# Patient Record
Sex: MALE | Race: BLACK OR AFRICAN AMERICAN | NOT HISPANIC OR LATINO | Employment: FULL TIME | URBAN - METROPOLITAN AREA
[De-identification: names, ages, dates, MRNs, and addresses within clinical notes are randomized per-mention and may not be internally consistent; named-entity substitution may affect disease eponyms.]

---

## 2017-05-25 DIAGNOSIS — M76.62 LEFT ACHILLES BURSITIS: Primary | ICD-10-CM

## 2017-05-25 DIAGNOSIS — M25.561 RIGHT KNEE PAIN, UNSPECIFIED CHRONICITY: ICD-10-CM

## 2017-05-26 ENCOUNTER — HOSPITAL ENCOUNTER (OUTPATIENT)
Dept: RADIOLOGY | Facility: HOSPITAL | Age: 27
Discharge: HOME | End: 2017-05-26
Attending: ORTHOPAEDIC SURGERY
Payer: COMMERCIAL

## 2017-05-26 DIAGNOSIS — M76.62 LEFT ACHILLES BURSITIS: ICD-10-CM

## 2017-05-26 PROCEDURE — 73721 MRI JNT OF LWR EXTRE W/O DYE: CPT | Mod: 26,LT,, | Performed by: RADIOLOGY

## 2017-05-26 PROCEDURE — 73721 MRI JNT OF LWR EXTRE W/O DYE: CPT | Mod: TC,LT

## 2017-05-26 RX ORDER — INDOMETHACIN 75 MG/1
75 CAPSULE, EXTENDED RELEASE ORAL 2 TIMES DAILY
Qty: 60 CAPSULE | Refills: 1 | Status: SHIPPED | OUTPATIENT
Start: 2017-05-26 | End: 2017-07-27 | Stop reason: SDUPTHER

## 2017-05-26 RX ORDER — ESOMEPRAZOLE MAGNESIUM 40 MG/1
40 CAPSULE, DELAYED RELEASE ORAL
Qty: 30 CAPSULE | Refills: 1 | Status: SHIPPED | OUTPATIENT
Start: 2017-05-26 | End: 2017-07-27 | Stop reason: SDUPTHER

## 2017-06-07 ENCOUNTER — HOSPITAL ENCOUNTER (OUTPATIENT)
Dept: RADIOLOGY | Facility: HOSPITAL | Age: 27
Discharge: HOME | End: 2017-06-07
Attending: ORTHOPAEDIC SURGERY
Payer: COMMERCIAL

## 2017-06-07 ENCOUNTER — TELEPHONE (OUTPATIENT)
Dept: SPORTS MEDICINE | Facility: CLINIC | Age: 27
End: 2017-06-07

## 2017-06-07 DIAGNOSIS — M25.572 LEFT ANKLE PAIN, UNSPECIFIED CHRONICITY: Primary | ICD-10-CM

## 2017-06-07 DIAGNOSIS — M25.572 LEFT ANKLE PAIN, UNSPECIFIED CHRONICITY: ICD-10-CM

## 2017-06-07 PROCEDURE — 73721 MRI JNT OF LWR EXTRE W/O DYE: CPT | Mod: 26,LT,, | Performed by: RADIOLOGY

## 2017-06-07 PROCEDURE — 73721 MRI JNT OF LWR EXTRE W/O DYE: CPT | Mod: TC,LT

## 2017-07-27 DIAGNOSIS — M76.62 LEFT ACHILLES BURSITIS: ICD-10-CM

## 2017-07-28 RX ORDER — INDOMETHACIN 75 MG/1
75 CAPSULE, EXTENDED RELEASE ORAL 2 TIMES DAILY
Qty: 60 CAPSULE | Refills: 1 | Status: SHIPPED | OUTPATIENT
Start: 2017-07-28

## 2017-07-28 RX ORDER — ESOMEPRAZOLE MAGNESIUM 40 MG/1
40 CAPSULE, DELAYED RELEASE ORAL
Qty: 30 CAPSULE | Refills: 1 | Status: SHIPPED | OUTPATIENT
Start: 2017-07-28

## 2017-08-02 DIAGNOSIS — S39.012A LUMBAR STRAIN, INITIAL ENCOUNTER: Primary | ICD-10-CM

## 2017-08-03 RX ORDER — CHLORZOXAZONE 500 MG/1
500 TABLET ORAL 3 TIMES DAILY
Qty: 90 TABLET | Refills: 1 | Status: SHIPPED | OUTPATIENT
Start: 2017-08-03 | End: 2017-10-02

## 2017-08-04 ENCOUNTER — TELEPHONE (OUTPATIENT)
Dept: SPORTS MEDICINE | Facility: CLINIC | Age: 27
End: 2017-08-04

## 2017-08-04 DIAGNOSIS — M62.89 MUSCLE TIGHTNESS: Primary | ICD-10-CM

## 2018-01-08 ENCOUNTER — OFFICE VISIT (OUTPATIENT)
Dept: OPHTHALMOLOGY | Facility: CLINIC | Age: 28
End: 2018-01-08
Payer: COMMERCIAL

## 2018-01-08 DIAGNOSIS — S05.8X2A BLUNT TRAUMA OF LEFT EYE, INITIAL ENCOUNTER: Primary | ICD-10-CM

## 2018-01-08 PROCEDURE — 99999 PR PBB SHADOW E&M-EST. PATIENT-LVL II: CPT | Mod: PBBFAC,,, | Performed by: OPHTHALMOLOGY

## 2018-01-08 PROCEDURE — 92004 COMPRE OPH EXAM NEW PT 1/>: CPT | Mod: S$GLB,,, | Performed by: OPHTHALMOLOGY

## 2018-01-08 PROCEDURE — 92020 GONIOSCOPY: CPT | Mod: S$GLB,,, | Performed by: OPHTHALMOLOGY

## 2018-01-08 NOTE — PROGRESS NOTES
HPI     27 year old male   Pt was struck in the left eye by another players finger during [employer] game   yesterday.   moderate pain.   No diplopia   Slight photophobia       Last edited by Maddie Perales on 1/8/2018 11:22 AM. (History)            Assessment /Plan     For exam results, see Encounter Report.    Blunt trauma of left eye, initial encounter      Blunt Trauma Finger to Left upper lid / eye 1/07/2018  Play [place] game [employer] / [place]     Denies photophobia, F / F and feels is back to baseline    Unremarkable eye exam    RD precautions:  Discussed symptoms of RD with increased flashes, floaters, decreasing vision.  Patient/Family to call and return immediately to clinic should the symptoms of RD occur. Voiced good understanding with Q & A.    Uses indomethacin throughout the season      Plan  RTC with pre-season screening  RTC sooner prn with good understanding

## 2018-06-11 ENCOUNTER — CLINICAL SUPPORT (OUTPATIENT)
Dept: ADMINISTRATIVE | Facility: CLINIC | Age: 28
End: 2018-06-11
Payer: COMMERCIAL

## 2018-06-11 DIAGNOSIS — Z00.00 ROUTINE GENERAL MEDICAL EXAMINATION AT A HEALTH CARE FACILITY: Primary | ICD-10-CM

## 2018-06-11 LAB
25(OH)D3+25(OH)D2 SERPL-MCNC: 39 NG/ML
ALBUMIN SERPL BCP-MCNC: 4.9 G/DL
ALP SERPL-CCNC: 63 U/L
ALT SERPL W/O P-5'-P-CCNC: 44 U/L
ANION GAP SERPL CALC-SCNC: 10 MMOL/L
AST SERPL-CCNC: 30 U/L
BILIRUB SERPL-MCNC: 1 MG/DL
BILIRUB UR QL STRIP: NEGATIVE
BUN SERPL-MCNC: 21 MG/DL
CALCIUM SERPL-MCNC: 10.3 MG/DL
CHLORIDE SERPL-SCNC: 104 MMOL/L
CHOLEST SERPL-MCNC: 177 MG/DL
CHOLEST/HDLC SERPL: 3.5 {RATIO}
CLARITY UR REFRACT.AUTO: CLEAR
CO2 SERPL-SCNC: 27 MMOL/L
COLOR UR AUTO: YELLOW
CREAT SERPL-MCNC: 1.3 MG/DL
ERYTHROCYTE [DISTWIDTH] IN BLOOD BY AUTOMATED COUNT: 11.8 %
EST. GFR  (AFRICAN AMERICAN): >60 ML/MIN/1.73 M^2
EST. GFR  (NON AFRICAN AMERICAN): >60 ML/MIN/1.73 M^2
ESTIMATED AVG GLUCOSE: 74 MG/DL
GLUCOSE SERPL-MCNC: 83 MG/DL
GLUCOSE UR QL STRIP: NEGATIVE
HBA1C MFR BLD HPLC: 4.2 %
HCT VFR BLD AUTO: 49 %
HDLC SERPL-MCNC: 50 MG/DL
HDLC SERPL: 28.2 %
HGB BLD-MCNC: 17.2 G/DL
HGB UR QL STRIP: NEGATIVE
KETONES UR QL STRIP: NEGATIVE
LDLC SERPL CALC-MCNC: 109.8 MG/DL
LEUKOCYTE ESTERASE UR QL STRIP: NEGATIVE
MCH RBC QN AUTO: 34.1 PG
MCHC RBC AUTO-ENTMCNC: 35.1 G/DL
MCV RBC AUTO: 97 FL
MICROSCOPIC COMMENT: NORMAL
NITRITE UR QL STRIP: NEGATIVE
NONHDLC SERPL-MCNC: 127 MG/DL
PH UR STRIP: 5 [PH] (ref 5–8)
PLATELET # BLD AUTO: 194 K/UL
PMV BLD AUTO: 11.9 FL
POTASSIUM SERPL-SCNC: 4.1 MMOL/L
PROT SERPL-MCNC: 8.1 G/DL
PROT UR QL STRIP: NEGATIVE
RBC # BLD AUTO: 5.04 M/UL
RBC #/AREA URNS AUTO: 1 /HPF (ref 0–4)
SODIUM SERPL-SCNC: 141 MMOL/L
SP GR UR STRIP: 1.03 (ref 1–1.03)
TRIGL SERPL-MCNC: 86 MG/DL
TSH SERPL DL<=0.005 MIU/L-ACNC: 1.15 UIU/ML
URN SPEC COLLECT METH UR: NORMAL
UROBILINOGEN UR STRIP-ACNC: NEGATIVE EU/DL
WBC # BLD AUTO: 6.8 K/UL
WBC #/AREA URNS AUTO: 1 /HPF (ref 0–5)

## 2018-06-11 PROCEDURE — 85027 COMPLETE CBC AUTOMATED: CPT

## 2018-06-11 PROCEDURE — 80061 LIPID PANEL: CPT

## 2018-06-11 PROCEDURE — 83036 HEMOGLOBIN GLYCOSYLATED A1C: CPT

## 2018-06-11 PROCEDURE — 80053 COMPREHEN METABOLIC PANEL: CPT

## 2018-06-11 PROCEDURE — 82306 VITAMIN D 25 HYDROXY: CPT

## 2018-06-11 PROCEDURE — 84443 ASSAY THYROID STIM HORMONE: CPT

## 2018-06-11 PROCEDURE — 81001 URINALYSIS AUTO W/SCOPE: CPT

## 2018-06-13 DIAGNOSIS — Z00.00 ROUTINE GENERAL MEDICAL EXAMINATION AT A HEALTH CARE FACILITY: Primary | ICD-10-CM

## 2018-09-24 ENCOUNTER — TELEPHONE (OUTPATIENT)
Dept: SPORTS MEDICINE | Facility: CLINIC | Age: 28
End: 2018-09-24

## 2018-09-24 ENCOUNTER — HOSPITAL ENCOUNTER (OUTPATIENT)
Dept: RADIOLOGY | Facility: HOSPITAL | Age: 28
Discharge: HOME | End: 2018-09-24
Attending: PHYSICIAN ASSISTANT
Payer: COMMERCIAL

## 2018-09-24 DIAGNOSIS — M79.601 RIGHT ARM PAIN: Primary | ICD-10-CM

## 2018-09-24 DIAGNOSIS — M79.601 RIGHT ARM PAIN: ICD-10-CM

## 2018-09-24 DIAGNOSIS — M79.602 LEFT UPPER LIMB PAIN: ICD-10-CM

## 2018-09-24 PROCEDURE — 73218 MRI UPPER EXTREMITY W/O DYE: CPT | Mod: TC,RT

## 2018-09-24 PROCEDURE — 73218 MRI UPPER EXTREMITY W/O DYE: CPT | Mod: 26,RT,, | Performed by: RADIOLOGY

## 2018-12-31 ENCOUNTER — TELEPHONE (OUTPATIENT)
Dept: SPORTS MEDICINE | Facility: CLINIC | Age: 28
End: 2018-12-31

## 2018-12-31 ENCOUNTER — HOSPITAL ENCOUNTER (OUTPATIENT)
Dept: RADIOLOGY | Facility: HOSPITAL | Age: 28
Discharge: HOME | End: 2018-12-31
Attending: ORTHOPAEDIC SURGERY
Payer: COMMERCIAL

## 2018-12-31 ENCOUNTER — HOSPITAL ENCOUNTER (OUTPATIENT)
Dept: INTERVENTIONAL RADIOLOGY/VASCULAR | Facility: HOSPITAL | Age: 28
Discharge: HOME | End: 2018-12-31
Attending: ORTHOPAEDIC SURGERY
Payer: COMMERCIAL

## 2018-12-31 VITALS
DIASTOLIC BLOOD PRESSURE: 75 MMHG | HEART RATE: 55 BPM | OXYGEN SATURATION: 96 % | RESPIRATION RATE: 16 BRPM | SYSTOLIC BLOOD PRESSURE: 144 MMHG

## 2018-12-31 DIAGNOSIS — M54.9 BACK PAIN, UNSPECIFIED BACK LOCATION, UNSPECIFIED BACK PAIN LATERALITY, UNSPECIFIED CHRONICITY: ICD-10-CM

## 2018-12-31 DIAGNOSIS — M54.9 BACK PAIN, UNSPECIFIED BACK LOCATION, UNSPECIFIED BACK PAIN LATERALITY, UNSPECIFIED CHRONICITY: Primary | ICD-10-CM

## 2018-12-31 PROCEDURE — 72158 MRI LUMBAR SPINE W/O & W/DYE: CPT | Mod: 26,,, | Performed by: RADIOLOGY

## 2018-12-31 PROCEDURE — A9585 GADOBUTROL INJECTION: HCPCS | Performed by: ORTHOPAEDIC SURGERY

## 2018-12-31 PROCEDURE — 64483 NJX AA&/STRD TFRM EPI L/S 1: CPT | Mod: 50,,, | Performed by: RADIOLOGY

## 2018-12-31 PROCEDURE — 25500020 PHARM REV CODE 255: Performed by: ORTHOPAEDIC SURGERY

## 2018-12-31 PROCEDURE — 72158 MRI LUMBAR SPINE W WO CONTRAST: ICD-10-PCS | Mod: 26,,, | Performed by: RADIOLOGY

## 2018-12-31 PROCEDURE — 64483 IR ESI LUMBAR W/O IMG: ICD-10-PCS | Mod: 50,,, | Performed by: RADIOLOGY

## 2018-12-31 PROCEDURE — 63600175 PHARM REV CODE 636 W HCPCS: Performed by: RADIOLOGY

## 2018-12-31 PROCEDURE — 72158 MRI LUMBAR SPINE W/O & W/DYE: CPT | Mod: TC

## 2018-12-31 PROCEDURE — A4215 STERILE NEEDLE: HCPCS

## 2018-12-31 RX ORDER — METHYLPREDNISOLONE ACETATE 40 MG/ML
120 INJECTION, SUSPENSION INTRA-ARTICULAR; INTRALESIONAL; INTRAMUSCULAR; SOFT TISSUE ONCE
Status: COMPLETED | OUTPATIENT
Start: 2018-12-31 | End: 2018-12-31

## 2018-12-31 RX ORDER — GADOBUTROL 604.72 MG/ML
10 INJECTION INTRAVENOUS
Status: COMPLETED | OUTPATIENT
Start: 2018-12-31 | End: 2018-12-31

## 2018-12-31 RX ADMIN — METHYLPREDNISOLONE ACETATE 120 MG: 40 INJECTION, SUSPENSION INTRA-ARTICULAR; INTRALESIONAL; INTRAMUSCULAR; SOFT TISSUE at 02:12

## 2018-12-31 RX ADMIN — GADOBUTROL 10 ML: 604.72 INJECTION INTRAVENOUS at 07:12

## 2018-12-31 NOTE — PROGRESS NOTES
Pt refusing d/c paperwork. Pt given verbal information on d/c instructions and caring for site. Dressing to lower back CDI. Pt refusing wheelchair at this time. Pt ambulated to lobby at this time with no distress.

## 2018-12-31 NOTE — PROCEDURES
Radiology Post-Procedure Note    Pre Op Diagnosis: LBP    Post Op Diagnosis: Same    Procedure: Lumbar interlaminal and transforaminal LEYDA    Procedure performed by: Meir Dumont MD    Written Informed Consent Obtained: Yes    Specimen Removed: NO    Estimated Blood Loss: Minimal    Findings:     Level injected: L5-S1  Needle used: 22 gauge needle for TF ESIs at L5-S1 bilaterally and 18 gauge for interlaminar LEYDA  Dose:  40 mg Depo-methylprednisolone x 3 at each site   1.5 mL  Bupivicaine 0.25% MPF x 3 at each site    Patient tolerated procedure well.    Meir Dumont MD  Department of Radiology  Pager: [phone]

## 2018-12-31 NOTE — H&P
Radiology Minor Procedure Note    Procedure Requested: LEYDA    History of Present Illness:  Ahsan Salmon is a 28 y.o. male with history of lumbar pain.  No past medical history on file.  Past Surgical History:   Procedure Laterality Date    LAMINECTOMY         Allergies: Review of patient's allergies indicates:  No Known Allergies    Current Inpatient Meds:   Home Meds:   Prior to Admission medications    Medication Sig Start Date End Date Taking? Authorizing Provider   esomeprazole (NEXIUM) 40 MG capsule Take 1 capsule (40 mg total) by mouth before breakfast. 7/28/17   Tadeo Cagle MD   indomethacin (INDOCIN SR) 75 mg CpSR CR capsule Take 1 capsule (75 mg total) by mouth 2 (two) times daily. 7/28/17   Tadeo Cagle MD   zolpidem (AMBIEN) 10 mg Tab Take 1 tablet (10 mg total) by mouth nightly as needed. 4/29/14 10/28/14  Tadeo Cagle MD      Anticoagulants/Antiplatelets: no anticoagulation    Labs:  No results found for: INR    Lab Results   Component Value Date    WBC 6.80 06/11/2018    HGB 17.2 06/11/2018    HCT 49.0 06/11/2018    MCV 97 06/11/2018     06/11/2018      Lab Results   Component Value Date    GLU 83 06/11/2018     06/11/2018    K 4.1 06/11/2018     06/11/2018    CO2 27 06/11/2018    BUN 21 (H) 06/11/2018    CREATININE 1.3 06/11/2018    CALCIUM 10.3 06/11/2018    ALT 44 06/11/2018    AST 30 06/11/2018    ALBUMIN 4.9 06/11/2018       Objective:  Vitals: Pulse: (!) 55 (12/31/18 1312)  Resp: 16 (12/31/18 1312)  BP: (!) 144/75 (12/31/18 1312)  SpO2: 96 % (12/31/18 1312)   PE:  No Upper Extremity clubbing, cyanosis or edema    Plan: LEYDA lumbar. Informed consent obtained. Local [facility]    Meir Dumont MD  Department of Radiology  Pager: [phone]

## 2019-06-05 DIAGNOSIS — Z00.00 ROUTINE GENERAL MEDICAL EXAMINATION AT A HEALTH CARE FACILITY: Primary | ICD-10-CM

## 2019-06-06 ENCOUNTER — HOSPITAL ENCOUNTER (OUTPATIENT)
Dept: CARDIOLOGY | Facility: CLINIC | Age: 29
Discharge: HOME | End: 2019-06-06

## 2019-06-06 ENCOUNTER — CLINICAL SUPPORT (OUTPATIENT)
Dept: INTERNAL MEDICINE | Facility: CLINIC | Age: 29
End: 2019-06-06

## 2019-06-06 DIAGNOSIS — Z00.00 ROUTINE GENERAL MEDICAL EXAMINATION AT A HEALTH CARE FACILITY: ICD-10-CM

## 2019-06-06 DIAGNOSIS — Z00.00 ANNUAL PHYSICAL EXAM: Primary | ICD-10-CM

## 2019-06-06 LAB
25(OH)D3+25(OH)D2 SERPL-MCNC: 27 NG/ML (ref 30–96)
ALBUMIN SERPL BCP-MCNC: 4.4 G/DL (ref 3.5–5.2)
ALP SERPL-CCNC: 60 U/L (ref 55–135)
ALT SERPL W/O P-5'-P-CCNC: 55 U/L (ref 10–44)
ANION GAP SERPL CALC-SCNC: 6 MMOL/L (ref 8–16)
AST SERPL-CCNC: 34 U/L (ref 10–40)
BILIRUB SERPL-MCNC: 1 MG/DL (ref 0.1–1)
BILIRUB UR QL STRIP: NEGATIVE
BUN SERPL-MCNC: 21 MG/DL (ref 6–20)
CALCIUM SERPL-MCNC: 9.9 MG/DL (ref 8.7–10.5)
CHLORIDE SERPL-SCNC: 104 MMOL/L (ref 95–110)
CHOLEST SERPL-MCNC: 143 MG/DL (ref 120–199)
CHOLEST/HDLC SERPL: 3.3 {RATIO} (ref 2–5)
CLARITY UR REFRACT.AUTO: CLEAR
CO2 SERPL-SCNC: 30 MMOL/L (ref 23–29)
COLOR UR AUTO: YELLOW
CREAT SERPL-MCNC: 1.2 MG/DL (ref 0.5–1.4)
ERYTHROCYTE [DISTWIDTH] IN BLOOD BY AUTOMATED COUNT: 11.9 % (ref 11.5–14.5)
EST. GFR  (AFRICAN AMERICAN): >60 ML/MIN/1.73 M^2
EST. GFR  (NON AFRICAN AMERICAN): >60 ML/MIN/1.73 M^2
ESTIMATED AVG GLUCOSE: 74 MG/DL (ref 68–131)
GLUCOSE SERPL-MCNC: 85 MG/DL (ref 70–110)
GLUCOSE UR QL STRIP: NEGATIVE
HBA1C MFR BLD HPLC: 4.2 % (ref 4–5.6)
HCT VFR BLD AUTO: 47.1 % (ref 40–54)
HDLC SERPL-MCNC: 43 MG/DL (ref 40–75)
HDLC SERPL: 30.1 % (ref 20–50)
HGB BLD-MCNC: 16.3 G/DL (ref 14–18)
HGB UR QL STRIP: NEGATIVE
KETONES UR QL STRIP: NEGATIVE
LDLC SERPL CALC-MCNC: 86.4 MG/DL (ref 63–159)
LEUKOCYTE ESTERASE UR QL STRIP: NEGATIVE
MCH RBC QN AUTO: 33.8 PG (ref 27–31)
MCHC RBC AUTO-ENTMCNC: 34.6 G/DL (ref 32–36)
MCV RBC AUTO: 98 FL (ref 82–98)
NITRITE UR QL STRIP: NEGATIVE
NONHDLC SERPL-MCNC: 100 MG/DL
PH UR STRIP: 6 [PH] (ref 5–8)
PLATELET # BLD AUTO: 177 K/UL (ref 150–350)
PMV BLD AUTO: 11.2 FL (ref 9.2–12.9)
POTASSIUM SERPL-SCNC: 4.2 MMOL/L (ref 3.5–5.1)
PROT SERPL-MCNC: 7.5 G/DL (ref 6–8.4)
PROT UR QL STRIP: NEGATIVE
RBC # BLD AUTO: 4.82 M/UL (ref 4.6–6.2)
SODIUM SERPL-SCNC: 140 MMOL/L (ref 136–145)
SP GR UR STRIP: 1.02 (ref 1–1.03)
TRIGL SERPL-MCNC: 68 MG/DL (ref 30–150)
TSH SERPL DL<=0.005 MIU/L-ACNC: 0.76 UIU/ML (ref 0.4–4)
URN SPEC COLLECT METH UR: NORMAL
WBC # BLD AUTO: 5.08 K/UL (ref 3.9–12.7)

## 2019-06-06 PROCEDURE — 85027 COMPLETE CBC AUTOMATED: CPT

## 2019-06-06 PROCEDURE — 93000 EKG 12-LEAD: ICD-10-PCS | Mod: S$GLB,,, | Performed by: INTERNAL MEDICINE

## 2019-06-06 PROCEDURE — 80061 LIPID PANEL: CPT

## 2019-06-06 PROCEDURE — 80053 COMPREHEN METABOLIC PANEL: CPT

## 2019-06-06 PROCEDURE — 84443 ASSAY THYROID STIM HORMONE: CPT

## 2019-06-06 PROCEDURE — 36415 COLL VENOUS BLD VENIPUNCTURE: CPT

## 2019-06-06 PROCEDURE — 81003 URINALYSIS AUTO W/O SCOPE: CPT

## 2019-06-06 PROCEDURE — 82306 VITAMIN D 25 HYDROXY: CPT

## 2019-06-06 PROCEDURE — 83036 HEMOGLOBIN GLYCOSYLATED A1C: CPT

## 2019-06-06 PROCEDURE — 93000 ELECTROCARDIOGRAM COMPLETE: CPT | Mod: S$GLB,,, | Performed by: INTERNAL MEDICINE

## 2019-09-23 ENCOUNTER — OFFICE VISIT (OUTPATIENT)
Dept: SPORTS MEDICINE | Facility: CLINIC | Age: 29
End: 2019-09-23
Payer: COMMERCIAL

## 2019-09-23 DIAGNOSIS — M79.652 LEFT THIGH PAIN: Primary | ICD-10-CM

## 2019-09-23 DIAGNOSIS — M25.462 EFFUSION OF LEFT PREPATELLAR BURSA: ICD-10-CM

## 2019-09-23 DIAGNOSIS — S76.112A STRAIN OF LEFT QUADRICEPS, INITIAL ENCOUNTER: ICD-10-CM

## 2019-09-23 PROCEDURE — 99999 PR PBB SHADOW E&M-EST. PATIENT-LVL I: ICD-10-PCS | Mod: PBBFAC,,, | Performed by: FAMILY MEDICINE

## 2019-09-23 PROCEDURE — 99213 PR OFFICE/OUTPT VISIT, EST, LEVL III, 20-29 MIN: ICD-10-PCS | Mod: S$GLB,,, | Performed by: FAMILY MEDICINE

## 2019-09-23 PROCEDURE — 99213 OFFICE O/P EST LOW 20 MIN: CPT | Mod: S$GLB,,, | Performed by: FAMILY MEDICINE

## 2019-09-23 PROCEDURE — 99999 PR PBB SHADOW E&M-EST. PATIENT-LVL I: CPT | Mod: PBBFAC,,, | Performed by: FAMILY MEDICINE

## 2019-09-23 NOTE — PROGRESS NOTES
Ahsan Salmon, a 29 y.o. male, presents today for evaluation of his left knee.  Patient reports having multiple hits to his lateral knee over the duration of the game yesterday. Patient reports that he feels about 70% improvement since yesterday. Patient has mild tenderness Vastus lateralis, left.    History of Present Illness (HPI)  Location: lateral knee, left  Onset: acute, 9.22.2019  Palliative:    Relative rest   Oral analgesics  Provocative:    ADLS   Prolonged ambulation  Prior: none  Progression: resolved discomfort  Quality:    sharp pain  Radiation: none  Severity: per nursing documentation  Timing: intermittent w/ use  Trauma:     Review of Systems (ROS)  A 10+ review of systems was performed with pertinent positives and negatives noted above in the history of present illness. Other systems were negative unless otherwise specified.    Physical Examination (PE)  General:  The patient is alert and oriented x 3. Mood is pleasant. Observation of ears, eyes and nose reveal no gross abnormalities. HEENT: NCAT, sclera anicteric.   Lungs: Respirations are equal and unlabored.  Gait is coordinated. Patient can toe walk and heel walk without difficulty.    LEFT KNEE EXAMINATION    Observation/Inspection  Gait:   Nonantalgic   Alignment:  Neutral   Scars:   None   Muscle atrophy: Mild  Effusion:  None   Warmth:  None   Discoloration:   none     Tenderness / Crepitus (T / C):         T / C      T / C  Patella   - / -   Lateral joint line   - / -     Peripatellar medial  -  Medial joint line    + / -  Peripatellar lateral -  Medial plica   - / -  Patellar tendon -   Popliteal fossa   - / -  Quad tendon   -   Gastrocnemius   -  Prepatellar Bursa - / -   Quadricep   -  Tibial tubercle  -  Thigh/hamstring  + TTP  Pes anserine/HS -  Fibula    -  ITB   - / -  Tibia     -  Tib/fib joint  - / -  LCL    -    MFC   - / -   MCL: Proximal  -    LFC   - / -   Distal    -          ROM: (* = pain)  PASSIVE   ACTIVE    Left :   5  / 0 / 145   5 / 0 / 145     Right :    5 / 0 / 145   5 / 0 / 145    Patellofemoral examination:  See above noted areas of tenderness.   Patella position    Subluxation / dislocation: Centered        Sup. / Inf;   Normal   Crepitus (PF):    Absent   Patellar Mobility:       Medial-lateral:   Normal    Superior-inferior:  Normal    Inferior tilt   Normal    Patellar tendon:  Normal   Lateral tilt:    Normal   J-sign:     None   Patellofemoral grind:   No pain     Meniscal Signs:     Pain on terminal extension:  +  Pain on terminal flexion:  +  Shars maneuver:  +*  Squat     NT  Thessaly    NT    Ligament Examination:  ACL / Lachman:  WNL  PCL-Post.  drawer: normal 0 to 2mm  MCL- Valgus:  normal 0 to 2mm  LCL- Varus:    normal 0 to 2mm  Pivot shift:  guarding   Dial Test:   difference c/w other side   At 30° flexion: normal (< 5°)    At 90° flexion: normal (< 5°)   Reverse Pivot Shift:   normal (Equal)     Strength: (* = with pain) Painful Side  Quadriceps   5/5  Hamstrin/5    Extremity Neuro-vascular Examination:   Sensation:  Grossly intact to light touch all dermatomal regions.   Motor Function:  Fully intact motor function at hip, knee, foot and ankle    DTRs;  quadriceps and  achilles 2+.  No clonus and downgoing Babinski.    Vascular status:  DP and PT pulses 2+, brisk capillary refill, symmetric.     Other Findings:    ASSESSMENT & PLAN  Assessment:   #1 acute strain distal vastus lateralis muscle fibers and MTJ, resolving, left   W/ associated fluid collection (?hematoma), almost completely resolved since last evening  #2 subacute aseptic pre patellar bursitis, left   W/ associated modest bursal effusion    No evidence of neurologic pathology  No evidence of vascular pathology    Imaging studies reviewed:   msk us, knee, left 23 (today)    Plan:    Relative rest  fPT w/ medical / training team  Oral NSAIDs  F/u prn    Pain management: handout given  Bracing: compression sleeve for #2  above  Physical therapy: w/ Team as above  Activity (e.g. sports, work) restrictions: as tolerated   school/vocation: [employer]    Follow up per pt / medical team  Should symptoms worsen or fail to resolve, consider:  Revisiting the above options

## 2019-09-29 ENCOUNTER — TELEPHONE (OUTPATIENT)
Dept: SPORTS MEDICINE | Facility: CLINIC | Age: 29
End: 2019-09-29

## 2019-09-29 DIAGNOSIS — M54.2 NECK PAIN: Primary | ICD-10-CM

## 2019-09-30 ENCOUNTER — HOSPITAL ENCOUNTER (OUTPATIENT)
Dept: RADIOLOGY | Facility: HOSPITAL | Age: 29
Discharge: HOME | End: 2019-09-30
Attending: ORTHOPAEDIC SURGERY
Payer: COMMERCIAL

## 2019-09-30 ENCOUNTER — HOSPITAL ENCOUNTER (OUTPATIENT)
Dept: INTERVENTIONAL RADIOLOGY/VASCULAR | Facility: HOSPITAL | Age: 29
Discharge: HOME | End: 2019-09-30
Attending: ORTHOPAEDIC SURGERY
Payer: COMMERCIAL

## 2019-09-30 ENCOUNTER — TELEPHONE (OUTPATIENT)
Dept: SPORTS MEDICINE | Facility: CLINIC | Age: 29
End: 2019-09-30

## 2019-09-30 VITALS
SYSTOLIC BLOOD PRESSURE: 156 MMHG | OXYGEN SATURATION: 99 % | HEART RATE: 65 BPM | RESPIRATION RATE: 18 BRPM | DIASTOLIC BLOOD PRESSURE: 70 MMHG

## 2019-09-30 DIAGNOSIS — M54.2 NECK PAIN: ICD-10-CM

## 2019-09-30 DIAGNOSIS — M54.2 NECK PAIN: Primary | ICD-10-CM

## 2019-09-30 PROCEDURE — A4215 STERILE NEEDLE: HCPCS

## 2019-09-30 PROCEDURE — 72141 MRI NECK SPINE W/O DYE: CPT | Mod: 26,,, | Performed by: RADIOLOGY

## 2019-09-30 PROCEDURE — 72052 X-RAY EXAM NECK SPINE 6/>VWS: CPT | Mod: TC

## 2019-09-30 PROCEDURE — 72052 XR CERVICAL SPINE 5 VIEW WITH FLEX AND EXT: ICD-10-PCS | Mod: 26,,, | Performed by: RADIOLOGY

## 2019-09-30 PROCEDURE — 63600175 PHARM REV CODE 636 W HCPCS: Performed by: ORTHOPAEDIC SURGERY

## 2019-09-30 PROCEDURE — 72141 MRI CERVICAL SPINE WITHOUT CONTRAST: ICD-10-PCS | Mod: 26,,, | Performed by: RADIOLOGY

## 2019-09-30 PROCEDURE — 64479 NJX AA&/STRD TFRM EPI C/T 1: CPT | Mod: 50,,, | Performed by: RADIOLOGY

## 2019-09-30 PROCEDURE — 64479 IR ESI CERVICAL/THORACIC W/ IMG: ICD-10-PCS | Mod: 50,,, | Performed by: RADIOLOGY

## 2019-09-30 PROCEDURE — 72052 X-RAY EXAM NECK SPINE 6/>VWS: CPT | Mod: 26,,, | Performed by: RADIOLOGY

## 2019-09-30 PROCEDURE — 72141 MRI NECK SPINE W/O DYE: CPT | Mod: TC

## 2019-09-30 PROCEDURE — 62321 NJX INTERLAMINAR CRV/THRC: CPT

## 2019-09-30 PROCEDURE — 25500020 PHARM REV CODE 255: Performed by: ORTHOPAEDIC SURGERY

## 2019-09-30 RX ORDER — DEXAMETHASONE SODIUM PHOSPHATE 100 MG/10ML
10 INJECTION INTRAMUSCULAR; INTRAVENOUS ONCE
Status: COMPLETED | OUTPATIENT
Start: 2019-09-30 | End: 2019-09-30

## 2019-09-30 RX ADMIN — IOHEXOL 2 ML: 180 INJECTION INTRAVENOUS at 03:09

## 2019-09-30 RX ADMIN — DEXAMETHASONE SODIUM PHOSPHATE 10 MG: 10 INJECTION INTRAMUSCULAR; INTRAVENOUS at 04:09

## 2019-09-30 NOTE — H&P
Radiology Minor Procedure Note    Procedure Requested: Transforaminal LEYDA bilat C4-5    History of Present Illness:  Ahsan Salmon is a 29 y.o. male with history of C4-5 disc protrusion and neck pain.  No past medical history on file.  Past Surgical History:   Procedure Laterality Date    LAMINECTOMY         Allergies: Review of patient's allergies indicates:  No Known Allergies    Current Inpatient Meds:   Home Meds:   Prior to Admission medications    Medication Sig Start Date End Date Taking? Authorizing Provider   esomeprazole (NEXIUM) 40 MG capsule Take 1 capsule (40 mg total) by mouth before breakfast. 7/28/17   Tadeo Cagle MD   indomethacin (INDOCIN SR) 75 mg CpSR CR capsule Take 1 capsule (75 mg total) by mouth 2 (two) times daily. 7/28/17   Tadeo Cagle MD   zolpidem (AMBIEN) 10 mg Tab Take 1 tablet (10 mg total) by mouth nightly as needed. 4/29/14 10/28/14  Tadeo Cagle MD      Anticoagulants/Antiplatelets: no anticoagulation    Labs:  No results found for: INR    Lab Results   Component Value Date    WBC 5.08 06/06/2019    HGB 16.3 06/06/2019    HCT 47.1 06/06/2019    MCV 98 06/06/2019     06/06/2019      Lab Results   Component Value Date    GLU 85 06/06/2019     06/06/2019    K 4.2 06/06/2019     06/06/2019    CO2 30 (H) 06/06/2019    BUN 21 (H) 06/06/2019    CREATININE 1.2 06/06/2019    CALCIUM 9.9 06/06/2019    ALT 55 (H) 06/06/2019    AST 34 06/06/2019    ALBUMIN 4.4 06/06/2019       Objective:  Vitals: Pulse: (!) 54 (09/30/19 1431)  BP: (!) 165/81 (09/30/19 1431)  SpO2: 99 % (09/30/19 1431)   PE:  Sensation: equal bilaterally    Plan: Transforaminal LEYDA bilateral C4-5    Ronald Tidwell MD  Attending Radiologist  Interventional Neuroradiology

## 2019-09-30 NOTE — PLAN OF CARE
Transforaminal LEYDA bilateral C4-5 completed.  Pt tolerated well. Discharge instructions reviewed.  Pt escorted out via wheelchair with therapist.

## 2019-09-30 NOTE — PROCEDURES
Radiology Post-Procedure Note    Pre Op Diagnosis: cervical disc protrusion    Post Op Diagnosis: Same    Procedure: Cervical LEYDA    Procedure performed by: Ronald Tidwell MD    Written Informed Consent Obtained: Yes    Specimen Removed: NO    Estimated Blood Loss: Minimal    Level injected: bilateral C4-5   Needle used: 22 gauge  Dose:  20 mg Dexamethasone   3 mL Lidocaine 1% MPF    Patient tolerated procedure well.    Ronald Tidwell MD  Attending Radiologist  Interventional Neuroradiology

## 2019-12-09 ENCOUNTER — OFFICE VISIT (OUTPATIENT)
Dept: SPORTS MEDICINE | Facility: CLINIC | Age: 29
End: 2019-12-09
Payer: COMMERCIAL

## 2019-12-09 ENCOUNTER — HOSPITAL ENCOUNTER (OUTPATIENT)
Dept: RADIOLOGY | Facility: HOSPITAL | Age: 29
Discharge: HOME | End: 2019-12-09
Attending: ORTHOPAEDIC SURGERY
Payer: COMMERCIAL

## 2019-12-09 ENCOUNTER — TELEPHONE (OUTPATIENT)
Dept: SPORTS MEDICINE | Facility: CLINIC | Age: 29
End: 2019-12-09

## 2019-12-09 VITALS — BODY MASS INDEX: 35.31 KG/M2 | WEIGHT: 290 LBS | HEIGHT: 76 IN | TEMPERATURE: 98 F

## 2019-12-09 DIAGNOSIS — T14.90XA MUSCLE INJURY: ICD-10-CM

## 2019-12-09 DIAGNOSIS — T14.90XA MUSCLE INJURY: Primary | ICD-10-CM

## 2019-12-09 DIAGNOSIS — S39.011A STRAIN OF ABDOMINAL WALL, INITIAL ENCOUNTER: Primary | ICD-10-CM

## 2019-12-09 PROCEDURE — 99999 PR PBB SHADOW E&M-EST. PATIENT-LVL II: CPT | Mod: PBBFAC,,, | Performed by: FAMILY MEDICINE

## 2019-12-09 PROCEDURE — 99499 UNLISTED E&M SERVICE: CPT | Mod: S$GLB,,, | Performed by: FAMILY MEDICINE

## 2019-12-09 PROCEDURE — 72170 X-RAY EXAM OF PELVIS: CPT | Mod: 26,,, | Performed by: RADIOLOGY

## 2019-12-09 PROCEDURE — 20611 LARGE JOINT ASPIRATION/INJECTION: R HIP JOINT: ICD-10-PCS | Mod: RT,S$GLB,, | Performed by: FAMILY MEDICINE

## 2019-12-09 PROCEDURE — 20611 DRAIN/INJ JOINT/BURSA W/US: CPT | Mod: RT,S$GLB,, | Performed by: FAMILY MEDICINE

## 2019-12-09 PROCEDURE — 72170 X-RAY EXAM OF PELVIS: CPT | Mod: TC

## 2019-12-09 PROCEDURE — 99499 NO LOS: ICD-10-PCS | Mod: S$GLB,,, | Performed by: FAMILY MEDICINE

## 2019-12-09 PROCEDURE — 72195 MRI PELVIS WITHOUT CONTRAST: ICD-10-PCS | Mod: 26,,, | Performed by: RADIOLOGY

## 2019-12-09 PROCEDURE — 72170 XR PELVIS ROUTINE AP: ICD-10-PCS | Mod: 26,,, | Performed by: RADIOLOGY

## 2019-12-09 PROCEDURE — 72195 MRI PELVIS W/O DYE: CPT | Mod: 26,,, | Performed by: RADIOLOGY

## 2019-12-09 PROCEDURE — 72195 MRI PELVIS W/O DYE: CPT | Mod: TC

## 2019-12-09 PROCEDURE — 99999 PR PBB SHADOW E&M-EST. PATIENT-LVL II: ICD-10-PCS | Mod: PBBFAC,,, | Performed by: FAMILY MEDICINE

## 2019-12-09 RX ORDER — TRIAMCINOLONE ACETONIDE 40 MG/ML
40 INJECTION, SUSPENSION INTRA-ARTICULAR; INTRAMUSCULAR
Status: DISCONTINUED | OUTPATIENT
Start: 2019-12-09 | End: 2019-12-09 | Stop reason: HOSPADM

## 2019-12-09 RX ADMIN — TRIAMCINOLONE ACETONIDE 40 MG: 40 INJECTION, SUSPENSION INTRA-ARTICULAR; INTRAMUSCULAR at 11:12

## 2019-12-09 NOTE — PROGRESS NOTES
Ahsan Salmon, a 29 y.o. male, is here for evaluation of *** hip.     HISTORY OF PRESENT ILLNESS   Location: *** thigh, ***  Onset: insidious, ***  Palliative:    Relative rest   Oral analgesics   ***  Provocative:   ADLs  ***   Prior: ***  Progression: *** discomfort   Quality:    ***  Radiation: ***  Severity: per nursing documentation  Timing: intermittent with use  Trauma: ***     Review of systems (ROS):  A 10+ review of systems was performed with pertinent positives and negatives noted above in the history of present illness. Other systems were negative unless otherwise specified.      PHYSICAL EXAMINATION  General:  The patient is alert and oriented x 3.  Mood is pleasant.  Observation of ears, eyes and nose reveal no gross abnormalities.  HEENT: NCAT, sclera nonicteric  Lungs: Respirations are equal and unlabored.   Gait is coordinated. Patient can toe walk and heel walk without difficulty.    HIP/PELVIS EXAMINATION    Observation/Inspection  Gait:   Nonantalgic   Alignment:  Neutral   Scars:   None   Muscle atrophy: None   Effusion:  None   Warmth:  None   Discoloration:   None   Leg lengths:   Equal   Pelvis:   Level     Tenderness/Crepitus (T/C):      T / C  Trochanteric bursa   - / -  Piriformis    - / -  SI joint    - / -  Psoas tendon   - / -  Rectus insertion  + / -  Adductor insertion  - / -  Pubic symphysis  - / -    ROM: (* = pain)    Flexion:      120 degrees  External rotation:   40 degrees  Internal rotation with axial load:  30 degrees  Internal rotation without axial load:  40 degrees  Abduction:    45 degrees  Adduction:     20 degrees    Special Tests:  Pain w/ forced internal rotation (FADIR):  -   Pain w/ forced external rotation (MAGDALENA):  -   Circumduction test:     -  Stinchfield test:     -   Log roll:       -   Snapping hip (internal):    -   Sit-up pain:      -   Resisted sit-up pain:     -   Resisted sit-up with adductor contraction pain:  -   Step-down test:     +  Trendelenburg  test:     -  Bridge test      +     Extremity Neuro-vascular Examination:   Sensation:  Grossly intact to light touch all dermatomal regions.   Motor Function:  Fully intact motor function at hip, knee, foot and ankle    DTRs;  quadriceps and  achilles 2+.  No clonus and downgoing Babinski.    Vascular status:  DP and PT pulses 2+, brisk capillary refill, symmetric.    Skin:  intact, compartments soft.    Other Findings:    ASSESSMENT & PLAN  Assessment:   #1 Tonnis Grade *** osteoarthritis of hip, ***   W/ greater trochanteric bursitis    No evidence of neurologic pathology  No evidence of vascular pathology    Imaging studies reviewed:   X-ray Pelvis, 19.12  MRI Pelvis, 19.12    Plan:    We discussed the importance of appropriate diet, weight, and regular exercise including quadriceps strengthening     We discussed options including:  #1 watchful waiting  #2 physical therapy aimed at:   Core stability   RoM hip   Strengthening quadriceps   Gait training   #3 injection therapy:   CSI GTB    Right,     Left,    CSI iaHip    Right,     Left,    Orthobiologics   #4 MRI for further evaluation      The patient chooses #    Pain management: handout given  Bracing:   Physical therapy:   Activity (e.g. sports, work) restrictions: as tolerated   school/vocation: [employer]       Follow up in 2 w  A/e CSI GTB  Effective-->hgPT, f/u in 12 w  Ineffective-->CSI iaHip, f/u in 2 w  Should symptoms worsen or fail to resolve, consider:  Revisiting the above options

## 2019-12-09 NOTE — PROCEDURES
"Large Joint Aspiration/Injection: R hip joint  Date/Time: 12/9/2019 11:00 AM  Performed by: Rogers Neri MD  Authorized by: Rogers Neri MD     Consent Done?:  Yes (Verbal)  Indications:  Pain  Procedure site marked: Yes    Timeout: Prior to procedure the correct patient, procedure, and site was verified      Location:  Hip  Site:  R hip joint  Prep: Patient was prepped and draped in usual sterile fashion    Needle size:  25 G  Ultrasonic Guidance for needle placement: Yes  Images are saved and documented.  Approach:  Anterolateral  Medications:  40 mg triamcinolone acetonide 40 mg/mL  Patient tolerance:  Patient tolerated the procedure well with no immediate complications    Additional Comments: Core muscle injury  RIGHT > LEFT discomfort  "Suspicion for tiny age indeterminate micro tear RIGHT rectus abdominus -adductor longus aponeurosis."    Description of ultrasound utilization for needle guidance:   Ultrasound guidance used for needle localization. Images saved and stored for documentation. The traumatized muscle fibers were visualized near the attachment on the pubic symphysis. Dynamic visualization of the 20g x 3.5" needle was continuous throughout the procedure.    Precautionary:  The patient's femoral artery, vein, and nerve were identified, marked with a skin marker, and visualized throughout the procedure, so as to avoid needle contact with those structures.      "

## 2020-07-31 ENCOUNTER — CLINICAL SUPPORT (OUTPATIENT)
Dept: ADMINISTRATIVE | Facility: CLINIC | Age: 30
End: 2020-07-31
Payer: COMMERCIAL

## 2020-07-31 DIAGNOSIS — Z00.00 ROUTINE GENERAL MEDICAL EXAMINATION AT A HEALTH CARE FACILITY: Primary | ICD-10-CM

## 2020-07-31 LAB
25(OH)D3+25(OH)D2 SERPL-MCNC: 28 NG/ML (ref 30–96)
ALBUMIN SERPL BCP-MCNC: 4.6 G/DL (ref 3.5–5.2)
ALP SERPL-CCNC: 53 U/L (ref 55–135)
ALT SERPL W/O P-5'-P-CCNC: 31 U/L (ref 10–44)
ANION GAP SERPL CALC-SCNC: 5 MMOL/L (ref 8–16)
AST SERPL-CCNC: 30 U/L (ref 10–40)
BACTERIA #/AREA URNS AUTO: NORMAL /HPF
BASOPHILS # BLD AUTO: 0.02 K/UL (ref 0–0.2)
BASOPHILS NFR BLD: 0.5 % (ref 0–1.9)
BILIRUB SERPL-MCNC: 0.8 MG/DL (ref 0.1–1)
BILIRUB UR QL STRIP: NEGATIVE
BUN SERPL-MCNC: 19 MG/DL (ref 6–20)
CALCIUM SERPL-MCNC: 9.7 MG/DL (ref 8.7–10.5)
CHLORIDE SERPL-SCNC: 106 MMOL/L (ref 95–110)
CHOLEST SERPL-MCNC: 146 MG/DL (ref 120–199)
CHOLEST/HDLC SERPL: 3.2 {RATIO} (ref 2–5)
CLARITY UR REFRACT.AUTO: CLEAR
CO2 SERPL-SCNC: 29 MMOL/L (ref 23–29)
COLOR UR AUTO: YELLOW
CREAT SERPL-MCNC: 1.2 MG/DL (ref 0.5–1.4)
DIFFERENTIAL METHOD: ABNORMAL
EOSINOPHIL # BLD AUTO: 0.2 K/UL (ref 0–0.5)
EOSINOPHIL NFR BLD: 4.3 % (ref 0–8)
ERYTHROCYTE [DISTWIDTH] IN BLOOD BY AUTOMATED COUNT: 11.9 % (ref 11.5–14.5)
EST. GFR  (AFRICAN AMERICAN): >60 ML/MIN/1.73 M^2
EST. GFR  (NON AFRICAN AMERICAN): >60 ML/MIN/1.73 M^2
ESTIMATED AVG GLUCOSE: 77 MG/DL (ref 68–131)
GLUCOSE SERPL-MCNC: 82 MG/DL (ref 70–110)
GLUCOSE UR QL STRIP: NEGATIVE
HBA1C MFR BLD HPLC: 4.3 % (ref 4–5.6)
HCT VFR BLD AUTO: 47.7 % (ref 40–54)
HDLC SERPL-MCNC: 46 MG/DL (ref 40–75)
HDLC SERPL: 31.5 % (ref 20–50)
HGB BLD-MCNC: 15.8 G/DL (ref 14–18)
HGB S BLD QL SOLY: NEGATIVE
HGB UR QL STRIP: NEGATIVE
IMM GRANULOCYTES # BLD AUTO: 0 K/UL (ref 0–0.04)
IMM GRANULOCYTES NFR BLD AUTO: 0 % (ref 0–0.5)
KETONES UR QL STRIP: NEGATIVE
LDLC SERPL CALC-MCNC: 80.8 MG/DL (ref 63–159)
LEUKOCYTE ESTERASE UR QL STRIP: NEGATIVE
LYMPHOCYTES # BLD AUTO: 1 K/UL (ref 1–4.8)
LYMPHOCYTES NFR BLD: 23.8 % (ref 18–48)
MCH RBC QN AUTO: 33.4 PG (ref 27–31)
MCHC RBC AUTO-ENTMCNC: 33.1 G/DL (ref 32–36)
MCV RBC AUTO: 101 FL (ref 82–98)
MICROSCOPIC COMMENT: NORMAL
MONOCYTES # BLD AUTO: 0.8 K/UL (ref 0.3–1)
MONOCYTES NFR BLD: 19.8 % (ref 4–15)
NEUTROPHILS # BLD AUTO: 2.2 K/UL (ref 1.8–7.7)
NEUTROPHILS NFR BLD: 51.6 % (ref 38–73)
NITRITE UR QL STRIP: NEGATIVE
NONHDLC SERPL-MCNC: 100 MG/DL
NRBC BLD-RTO: 0 /100 WBC
PH UR STRIP: 8 [PH] (ref 5–8)
PLATELET # BLD AUTO: 174 K/UL (ref 150–350)
PMV BLD AUTO: 12.3 FL (ref 9.2–12.9)
POTASSIUM SERPL-SCNC: 4.6 MMOL/L (ref 3.5–5.1)
PROT SERPL-MCNC: 7.5 G/DL (ref 6–8.4)
PROT UR QL STRIP: NEGATIVE
RBC # BLD AUTO: 4.73 M/UL (ref 4.6–6.2)
RBC #/AREA URNS AUTO: 0 /HPF (ref 0–4)
SODIUM SERPL-SCNC: 140 MMOL/L (ref 136–145)
SP GR UR STRIP: 1.02 (ref 1–1.03)
TRIGL SERPL-MCNC: 96 MG/DL (ref 30–150)
TSH SERPL DL<=0.005 MIU/L-ACNC: 0.68 UIU/ML (ref 0.4–4)
URN SPEC COLLECT METH UR: NORMAL
WBC # BLD AUTO: 4.2 K/UL (ref 3.9–12.7)
WBC #/AREA URNS AUTO: 1 /HPF (ref 0–5)

## 2020-07-31 PROCEDURE — 82306 VITAMIN D 25 HYDROXY: CPT

## 2020-07-31 PROCEDURE — 80053 COMPREHEN METABOLIC PANEL: CPT

## 2020-07-31 PROCEDURE — 93005 ELECTROCARDIOGRAM TRACING: CPT | Mod: S$GLB,,, | Performed by: INTERNAL MEDICINE

## 2020-07-31 PROCEDURE — 93010 ELECTROCARDIOGRAM REPORT: CPT | Mod: S$GLB,,, | Performed by: INTERNAL MEDICINE

## 2020-07-31 PROCEDURE — 93005 EKG 12-LEAD: ICD-10-PCS | Mod: S$GLB,,, | Performed by: INTERNAL MEDICINE

## 2020-07-31 PROCEDURE — 85660 RBC SICKLE CELL TEST: CPT

## 2020-07-31 PROCEDURE — 80061 LIPID PANEL: CPT

## 2020-07-31 PROCEDURE — 81001 URINALYSIS AUTO W/SCOPE: CPT

## 2020-07-31 PROCEDURE — 93010 EKG 12-LEAD: ICD-10-PCS | Mod: S$GLB,,, | Performed by: INTERNAL MEDICINE

## 2020-07-31 PROCEDURE — 83036 HEMOGLOBIN GLYCOSYLATED A1C: CPT

## 2020-07-31 PROCEDURE — 85025 COMPLETE CBC W/AUTO DIFF WBC: CPT

## 2020-07-31 PROCEDURE — 82955 ASSAY OF G6PD ENZYME: CPT

## 2020-07-31 PROCEDURE — 84443 ASSAY THYROID STIM HORMONE: CPT

## 2020-08-05 LAB — G6PD RBC-CCNT: 1.5 U/G HGB (ref 7–20.5)

## 2020-09-28 ENCOUNTER — TELEPHONE (OUTPATIENT)
Dept: SPORTS MEDICINE | Facility: CLINIC | Age: 30
End: 2020-09-28

## 2020-09-28 ENCOUNTER — HOSPITAL ENCOUNTER (OUTPATIENT)
Dept: RADIOLOGY | Facility: HOSPITAL | Age: 30
Discharge: HOME | End: 2020-09-28
Attending: ORTHOPAEDIC SURGERY
Payer: COMMERCIAL

## 2020-09-28 DIAGNOSIS — M79.601 RIGHT ARM PAIN: Primary | ICD-10-CM

## 2020-09-28 DIAGNOSIS — M79.601 RIGHT ARM PAIN: ICD-10-CM

## 2020-09-28 PROCEDURE — 73218 MRI UPPER EXTREMITY W/O DYE: CPT | Mod: 26,RT,, | Performed by: RADIOLOGY

## 2020-09-28 PROCEDURE — 73218 MRI HUMERUS WITHOUT CONTRAST RIGHT: ICD-10-PCS | Mod: 26,RT,, | Performed by: RADIOLOGY

## 2020-09-28 PROCEDURE — 73218 MRI UPPER EXTREMITY W/O DYE: CPT | Mod: TC,RT

## 2020-11-16 ENCOUNTER — TELEPHONE (OUTPATIENT)
Dept: SPORTS MEDICINE | Facility: CLINIC | Age: 30
End: 2020-11-16

## 2020-11-16 ENCOUNTER — HOSPITAL ENCOUNTER (OUTPATIENT)
Dept: INTERVENTIONAL RADIOLOGY/VASCULAR | Facility: HOSPITAL | Age: 30
Discharge: HOME | End: 2020-11-16
Attending: ORTHOPAEDIC SURGERY
Payer: COMMERCIAL

## 2020-11-16 DIAGNOSIS — M54.9 BACK PAIN, UNSPECIFIED BACK LOCATION, UNSPECIFIED BACK PAIN LATERALITY, UNSPECIFIED CHRONICITY: ICD-10-CM

## 2020-11-16 DIAGNOSIS — M54.9 BACK PAIN, UNSPECIFIED BACK LOCATION, UNSPECIFIED BACK PAIN LATERALITY, UNSPECIFIED CHRONICITY: Primary | ICD-10-CM

## 2020-11-16 PROCEDURE — 25500020 PHARM REV CODE 255: Performed by: RADIOLOGY

## 2020-11-16 PROCEDURE — 64483 NJX AA&/STRD TFRM EPI L/S 1: CPT | Mod: 50,,, | Performed by: RADIOLOGY

## 2020-11-16 PROCEDURE — 64483 IR ESI LUMBAR W/ IMG: ICD-10-PCS | Mod: 50,,, | Performed by: RADIOLOGY

## 2020-11-16 PROCEDURE — A4215 STERILE NEEDLE: HCPCS

## 2020-11-16 PROCEDURE — 63600175 PHARM REV CODE 636 W HCPCS: Performed by: RADIOLOGY

## 2020-11-16 RX ORDER — METHYLPREDNISOLONE ACETATE 40 MG/ML
INJECTION, SUSPENSION INTRA-ARTICULAR; INTRALESIONAL; INTRAMUSCULAR; SOFT TISSUE CODE/TRAUMA/SEDATION MEDICATION
Status: COMPLETED | OUTPATIENT
Start: 2020-11-16 | End: 2020-11-16

## 2020-11-16 RX ADMIN — METHYLPREDNISOLONE ACETATE 80 MG: 40 INJECTION, SUSPENSION INTRA-ARTICULAR; INTRALESIONAL; INTRAMUSCULAR; SOFT TISSUE at 09:11

## 2020-11-16 RX ADMIN — IOHEXOL 4 ML: 180 INJECTION INTRAVENOUS at 09:11

## 2020-11-16 NOTE — PROCEDURES
Radiology Post-Procedure Note    Pre Op Diagnosis: LBP    Post Op Diagnosis: Same    Procedure: Lumbar Transforaminal LEYDA    Procedure performed by: Ronald Tidwell MD    Written Informed Consent Obtained: Yes    Specimen Removed: NO    Estimated Blood Loss: Minimal    Findings: good position of the injections confirmed using Omnipaque 180 contrast    Level injected: L5-S1 bilateral  Needle used: 22 gauge  Dose:  80 mg Depo-methylprednisolone   3 mL Lidocaine 1% MPF    Patient tolerated procedure well.    Ronald Tidwell MD  Attending Radiologist  Interventional Neuroradiology

## 2020-11-16 NOTE — H&P
Radiology History & Physical      SUBJECTIVE:     Chief Complaint: lumbar pain    History of Present Illness:  Ahsan Salmon is a 30 y.o. male who presents for TFESI of the lumbar spine.    No past medical history on file.  Past Surgical History:   Procedure Laterality Date    LAMINECTOMY         Home Meds:   Prior to Admission medications    Medication Sig Start Date End Date Taking? Authorizing Provider   esomeprazole (NEXIUM) 40 MG capsule Take 1 capsule (40 mg total) by mouth before breakfast. 7/28/17   Tadeo Cagle MD   indomethacin (INDOCIN SR) 75 mg CpSR CR capsule Take 1 capsule (75 mg total) by mouth 2 (two) times daily. 7/28/17   Tadeo Cagle MD   zolpidem (AMBIEN) 10 mg Tab Take 1 tablet (10 mg total) by mouth nightly as needed. 4/29/14 10/28/14  Tadeo Cagle MD     Anticoagulants/Antiplatelets: no anticoagulation    Allergies: Review of patient's allergies indicates:  No Known Allergies  Sedation History:  no adverse reactions    Review of Systems:   Hematological: no known coagulopathies  Respiratory: no shortness of breath  Cardiovascular: no chest pain  Gastrointestinal: no abdominal pain  Genito-Urinary: no dysuria  Musculoskeletal: negative  Neurological: no TIA or stroke symptoms         OBJECTIVE:     Vital Signs (Most Recent)       Physical Exam:  ASA: 2  Mallampati: 2    General: no acute distress  Mental Status: alert and oriented to person, place and time  HEENT: normocephalic, atraumatic  Chest: unlabored breathing  Heart: regular heart rate  Abdomen: nondistended  Extremity: moves all extremities    Laboratory  No results found for: INR    Lab Results   Component Value Date    WBC 4.20 07/31/2020    HGB 15.8 07/31/2020    HCT 47.7 07/31/2020     (H) 07/31/2020     07/31/2020      Lab Results   Component Value Date    GLU 82 07/31/2020     07/31/2020    K 4.6 07/31/2020     07/31/2020    CO2 29 07/31/2020    BUN 19 07/31/2020    CREATININE 1.2 07/31/2020     CALCIUM 9.7 07/31/2020    ALT 31 07/31/2020    AST 30 07/31/2020    ALBUMIN 4.6 07/31/2020    BILITOT 0.8 07/31/2020       ASSESSMENT/PLAN:     Sedation Plan: up to moderate  Patient will undergo L5-S1 bilateral LEYDA.    Rogers Maria M.D.  PGY-III Radiology  Pager: [phone]

## 2020-11-16 NOTE — SEDATION DOCUMENTATION
LEYDA lumbar procedure completed, band aid applied CDI, no bleeding, no hematoma noted, dc instructions given to the patient, pt walked out in NAD and denies any pain or SOB

## 2021-01-19 ENCOUNTER — TELEPHONE (OUTPATIENT)
Dept: SPORTS MEDICINE | Facility: CLINIC | Age: 31
End: 2021-01-19

## 2021-01-19 DIAGNOSIS — M25.561 RIGHT KNEE PAIN, UNSPECIFIED CHRONICITY: Primary | ICD-10-CM

## 2021-01-20 ENCOUNTER — HOSPITAL ENCOUNTER (OUTPATIENT)
Dept: RADIOLOGY | Facility: HOSPITAL | Age: 31
Discharge: HOME | End: 2021-01-20
Attending: ORTHOPAEDIC SURGERY
Payer: COMMERCIAL

## 2021-01-20 DIAGNOSIS — M25.561 RIGHT KNEE PAIN, UNSPECIFIED CHRONICITY: ICD-10-CM

## 2021-01-20 PROCEDURE — 73721 MRI JNT OF LWR EXTRE W/O DYE: CPT | Mod: 26,LT,, | Performed by: RADIOLOGY

## 2021-01-20 PROCEDURE — 73564 XR KNEE ORTHO LEFT WITH FLEXION: ICD-10-PCS | Mod: 26,LT,, | Performed by: RADIOLOGY

## 2021-01-20 PROCEDURE — 73564 X-RAY EXAM KNEE 4 OR MORE: CPT | Mod: TC,LT

## 2021-01-20 PROCEDURE — 73564 X-RAY EXAM KNEE 4 OR MORE: CPT | Mod: 26,LT,, | Performed by: RADIOLOGY

## 2021-01-20 PROCEDURE — 73562 X-RAY EXAM OF KNEE 3: CPT | Mod: 26,RT,, | Performed by: RADIOLOGY

## 2021-01-20 PROCEDURE — 73721 MRI KNEE WITHOUT CONTRAST LEFT: ICD-10-PCS | Mod: 26,LT,, | Performed by: RADIOLOGY

## 2021-01-20 PROCEDURE — 73721 MRI JNT OF LWR EXTRE W/O DYE: CPT | Mod: TC,LT

## 2021-01-20 PROCEDURE — 73562 XR KNEE ORTHO LEFT WITH FLEXION: ICD-10-PCS | Mod: 26,RT,, | Performed by: RADIOLOGY

## 2021-01-21 ENCOUNTER — OFFICE VISIT (OUTPATIENT)
Dept: SPORTS MEDICINE | Facility: CLINIC | Age: 31
End: 2021-01-21
Payer: COMMERCIAL

## 2021-01-21 DIAGNOSIS — M25.511 RIGHT SHOULDER PAIN, UNSPECIFIED CHRONICITY: Primary | ICD-10-CM

## 2021-01-21 DIAGNOSIS — M79.601 RIGHT ARM PAIN: ICD-10-CM

## 2021-01-21 PROCEDURE — 99499 UNLISTED E&M SERVICE: CPT | Mod: ,,, | Performed by: FAMILY MEDICINE

## 2021-01-21 PROCEDURE — 99999 PR PBB SHADOW E&M-EST. PATIENT-LVL I: ICD-10-PCS | Mod: PBBFAC,,, | Performed by: FAMILY MEDICINE

## 2021-01-21 PROCEDURE — 99499 NO LOS: ICD-10-PCS | Mod: ,,, | Performed by: FAMILY MEDICINE

## 2021-01-21 PROCEDURE — 0232T NJX PLATELET PLASMA: CPT | Mod: CSM,,, | Performed by: FAMILY MEDICINE

## 2021-01-21 PROCEDURE — 99999 PR PBB SHADOW E&M-EST. PATIENT-LVL I: CPT | Mod: PBBFAC,,, | Performed by: FAMILY MEDICINE

## 2021-01-21 PROCEDURE — 0232T PR INJECT PLATELET PLASMA W/IMG HARVEST/PREPARATOIN: ICD-10-PCS | Mod: CSM,,, | Performed by: FAMILY MEDICINE

## 2021-06-07 ENCOUNTER — CLINICAL SUPPORT (OUTPATIENT)
Dept: ADMINISTRATIVE | Facility: CLINIC | Age: 31
End: 2021-06-07
Payer: COMMERCIAL

## 2021-06-07 DIAGNOSIS — Z00.00 ROUTINE GENERAL MEDICAL EXAMINATION AT A HEALTH CARE FACILITY: Primary | ICD-10-CM

## 2021-06-07 LAB
25(OH)D3+25(OH)D2 SERPL-MCNC: 36 NG/ML (ref 30–96)
ALBUMIN SERPL BCP-MCNC: 4.7 G/DL (ref 3.5–5.2)
ALP SERPL-CCNC: 55 U/L (ref 55–135)
ALT SERPL W/O P-5'-P-CCNC: 29 U/L (ref 10–44)
ANION GAP SERPL CALC-SCNC: 11 MMOL/L (ref 8–16)
AST SERPL-CCNC: 26 U/L (ref 10–40)
BASOPHILS # BLD AUTO: 0.02 K/UL (ref 0–0.2)
BASOPHILS NFR BLD: 0.5 % (ref 0–1.9)
BILIRUB SERPL-MCNC: 0.8 MG/DL (ref 0.1–1)
BILIRUB UR QL STRIP: NEGATIVE
BUN SERPL-MCNC: 20 MG/DL (ref 6–20)
CALCIUM SERPL-MCNC: 10.2 MG/DL (ref 8.7–10.5)
CHLORIDE SERPL-SCNC: 102 MMOL/L (ref 95–110)
CHOLEST SERPL-MCNC: 157 MG/DL (ref 120–199)
CHOLEST/HDLC SERPL: 3.1 {RATIO} (ref 2–5)
CLARITY UR REFRACT.AUTO: CLEAR
CO2 SERPL-SCNC: 27 MMOL/L (ref 23–29)
COLOR UR AUTO: YELLOW
CREAT SERPL-MCNC: 1.5 MG/DL (ref 0.5–1.4)
DIFFERENTIAL METHOD: ABNORMAL
EOSINOPHIL # BLD AUTO: 0.1 K/UL (ref 0–0.5)
EOSINOPHIL NFR BLD: 2 % (ref 0–8)
ERYTHROCYTE [DISTWIDTH] IN BLOOD BY AUTOMATED COUNT: 12.1 % (ref 11.5–14.5)
EST. GFR  (AFRICAN AMERICAN): >60 ML/MIN/1.73 M^2
EST. GFR  (NON AFRICAN AMERICAN): >60 ML/MIN/1.73 M^2
ESTIMATED AVG GLUCOSE: 74 MG/DL (ref 68–131)
GLUCOSE SERPL-MCNC: 75 MG/DL (ref 70–110)
GLUCOSE UR QL STRIP: NEGATIVE
HBA1C MFR BLD: 4.2 % (ref 4–5.6)
HCT VFR BLD AUTO: 48.7 % (ref 40–54)
HDLC SERPL-MCNC: 51 MG/DL (ref 40–75)
HDLC SERPL: 32.5 % (ref 20–50)
HGB BLD-MCNC: 16.4 G/DL (ref 14–18)
HGB S BLD QL SOLY: NEGATIVE
HGB UR QL STRIP: NEGATIVE
IMM GRANULOCYTES # BLD AUTO: 0.01 K/UL (ref 0–0.04)
IMM GRANULOCYTES NFR BLD AUTO: 0.3 % (ref 0–0.5)
KETONES UR QL STRIP: NEGATIVE
LDLC SERPL CALC-MCNC: 89.8 MG/DL (ref 63–159)
LEUKOCYTE ESTERASE UR QL STRIP: NEGATIVE
LYMPHOCYTES # BLD AUTO: 1 K/UL (ref 1–4.8)
LYMPHOCYTES NFR BLD: 25.8 % (ref 18–48)
MCH RBC QN AUTO: 33.2 PG (ref 27–31)
MCHC RBC AUTO-ENTMCNC: 33.7 G/DL (ref 32–36)
MCV RBC AUTO: 99 FL (ref 82–98)
MONOCYTES # BLD AUTO: 0.4 K/UL (ref 0.3–1)
MONOCYTES NFR BLD: 8.8 % (ref 4–15)
NEUTROPHILS # BLD AUTO: 2.5 K/UL (ref 1.8–7.7)
NEUTROPHILS NFR BLD: 62.6 % (ref 38–73)
NITRITE UR QL STRIP: NEGATIVE
NONHDLC SERPL-MCNC: 106 MG/DL
NRBC BLD-RTO: 0 /100 WBC
PH UR STRIP: 6 [PH] (ref 5–8)
PLATELET # BLD AUTO: 160 K/UL (ref 150–450)
PMV BLD AUTO: 13.3 FL (ref 9.2–12.9)
POTASSIUM SERPL-SCNC: 4.2 MMOL/L (ref 3.5–5.1)
PROT SERPL-MCNC: 7.9 G/DL (ref 6–8.4)
PROT UR QL STRIP: NEGATIVE
RBC # BLD AUTO: 4.94 M/UL (ref 4.6–6.2)
SARS-COV-2 IGG SERPL IA-ACNC: <50 AU/ML
SARS-COV-2 IGG SERPL QL IA: NEGATIVE
SODIUM SERPL-SCNC: 140 MMOL/L (ref 136–145)
SP GR UR STRIP: 1.02 (ref 1–1.03)
TRIGL SERPL-MCNC: 81 MG/DL (ref 30–150)
TSH SERPL DL<=0.005 MIU/L-ACNC: 0.92 UIU/ML (ref 0.4–4)
URN SPEC COLLECT METH UR: NORMAL
WBC # BLD AUTO: 3.96 K/UL (ref 3.9–12.7)

## 2021-06-07 PROCEDURE — 93005 EKG 12-LEAD: ICD-10-PCS | Mod: S$GLB,,, | Performed by: INTERNAL MEDICINE

## 2021-06-07 PROCEDURE — 93010 EKG 12-LEAD: ICD-10-PCS | Mod: S$GLB,,, | Performed by: INTERNAL MEDICINE

## 2021-06-07 PROCEDURE — 80053 COMPREHEN METABOLIC PANEL: CPT | Performed by: INTERNAL MEDICINE

## 2021-06-07 PROCEDURE — 85660 RBC SICKLE CELL TEST: CPT | Performed by: INTERNAL MEDICINE

## 2021-06-07 PROCEDURE — 80061 LIPID PANEL: CPT | Performed by: INTERNAL MEDICINE

## 2021-06-07 PROCEDURE — 83036 HEMOGLOBIN GLYCOSYLATED A1C: CPT | Performed by: INTERNAL MEDICINE

## 2021-06-07 PROCEDURE — 82306 VITAMIN D 25 HYDROXY: CPT | Performed by: INTERNAL MEDICINE

## 2021-06-07 PROCEDURE — 82955 ASSAY OF G6PD ENZYME: CPT | Performed by: INTERNAL MEDICINE

## 2021-06-07 PROCEDURE — 93005 ELECTROCARDIOGRAM TRACING: CPT | Mod: S$GLB,,, | Performed by: INTERNAL MEDICINE

## 2021-06-07 PROCEDURE — 86769 SARS-COV-2 COVID-19 ANTIBODY: CPT | Performed by: INTERNAL MEDICINE

## 2021-06-07 PROCEDURE — 85025 COMPLETE CBC W/AUTO DIFF WBC: CPT | Performed by: INTERNAL MEDICINE

## 2021-06-07 PROCEDURE — 84443 ASSAY THYROID STIM HORMONE: CPT | Performed by: INTERNAL MEDICINE

## 2021-06-07 PROCEDURE — 81003 URINALYSIS AUTO W/O SCOPE: CPT | Performed by: INTERNAL MEDICINE

## 2021-06-07 PROCEDURE — 93010 ELECTROCARDIOGRAM REPORT: CPT | Mod: S$GLB,,, | Performed by: INTERNAL MEDICINE

## 2021-06-11 LAB — G6PD RBC-CCNT: 1.2 U/G HGB (ref 7–20.5)

## 2021-06-30 ENCOUNTER — IMMUNIZATION (OUTPATIENT)
Dept: PRIMARY CARE CLINIC | Facility: CLINIC | Age: 31
End: 2021-06-30
Payer: COMMERCIAL

## 2021-06-30 DIAGNOSIS — Z23 NEED FOR VACCINATION: Primary | ICD-10-CM

## 2021-06-30 PROCEDURE — 91300 COVID-19, MRNA, LNP-S, PF, 30 MCG/0.3 ML DOSE VACCINE: ICD-10-PCS | Mod: S$GLB,,, | Performed by: INTERNAL MEDICINE

## 2021-06-30 PROCEDURE — 91300 COVID-19, MRNA, LNP-S, PF, 30 MCG/0.3 ML DOSE VACCINE: CPT | Mod: S$GLB,,, | Performed by: INTERNAL MEDICINE

## 2021-06-30 PROCEDURE — 0001A COVID-19, MRNA, LNP-S, PF, 30 MCG/0.3 ML DOSE VACCINE: ICD-10-PCS | Mod: CV19,S$GLB,, | Performed by: INTERNAL MEDICINE

## 2021-06-30 PROCEDURE — 0001A COVID-19, MRNA, LNP-S, PF, 30 MCG/0.3 ML DOSE VACCINE: CPT | Mod: CV19,S$GLB,, | Performed by: INTERNAL MEDICINE

## 2022-01-10 ENCOUNTER — TELEPHONE (OUTPATIENT)
Dept: SPORTS MEDICINE | Facility: CLINIC | Age: 32
End: 2022-01-10
Payer: COMMERCIAL

## 2022-01-10 ENCOUNTER — HOSPITAL ENCOUNTER (OUTPATIENT)
Dept: RADIOLOGY | Facility: HOSPITAL | Age: 32
Discharge: HOME | End: 2022-01-10
Attending: ORTHOPAEDIC SURGERY
Payer: COMMERCIAL

## 2022-01-10 DIAGNOSIS — M79.671 RIGHT FOOT PAIN: Primary | ICD-10-CM

## 2022-01-10 DIAGNOSIS — M79.671 RIGHT FOOT PAIN: ICD-10-CM

## 2022-01-10 PROCEDURE — 73630 X-RAY EXAM OF FOOT: CPT | Mod: TC,RT

## 2022-01-10 PROCEDURE — 73718 MRI LOWER EXTREMITY W/O DYE: CPT | Mod: TC,RT

## 2022-01-10 PROCEDURE — 73630 X-RAY EXAM OF FOOT: CPT | Mod: 26,RT,, | Performed by: RADIOLOGY

## 2022-01-10 PROCEDURE — 73630 XR FOOT COMPLETE 3 VIEW RIGHT: ICD-10-PCS | Mod: 26,RT,, | Performed by: RADIOLOGY

## 2022-01-10 PROCEDURE — 73718 MRI FOOT (HINDFOOT) RIGHT WITHOUT CONTRAST: ICD-10-PCS | Mod: 26,RT,, | Performed by: RADIOLOGY

## 2022-01-10 PROCEDURE — 73718 MRI LOWER EXTREMITY W/O DYE: CPT | Mod: 26,RT,, | Performed by: RADIOLOGY

## 2022-03-15 ENCOUNTER — OFFICE VISIT (OUTPATIENT)
Dept: OPHTHALMOLOGY | Facility: CLINIC | Age: 32
End: 2022-03-15
Payer: COMMERCIAL

## 2022-03-15 DIAGNOSIS — H00.15 CHALAZION LEFT LOWER EYELID: Primary | ICD-10-CM

## 2022-03-15 PROCEDURE — 99999 PR PBB SHADOW E&M-EST. PATIENT-LVL II: ICD-10-PCS | Mod: PBBFAC,,, | Performed by: OPHTHALMOLOGY

## 2022-03-15 PROCEDURE — 99204 PR OFFICE/OUTPT VISIT, NEW, LEVL IV, 45-59 MIN: ICD-10-PCS | Mod: S$GLB,,, | Performed by: OPHTHALMOLOGY

## 2022-03-15 PROCEDURE — 1159F PR MEDICATION LIST DOCUMENTED IN MEDICAL RECORD: ICD-10-PCS | Mod: CPTII,S$GLB,, | Performed by: OPHTHALMOLOGY

## 2022-03-15 PROCEDURE — 99204 OFFICE O/P NEW MOD 45 MIN: CPT | Mod: S$GLB,,, | Performed by: OPHTHALMOLOGY

## 2022-03-15 PROCEDURE — 1160F PR REVIEW ALL MEDS BY PRESCRIBER/CLIN PHARMACIST DOCUMENTED: ICD-10-PCS | Mod: CPTII,S$GLB,, | Performed by: OPHTHALMOLOGY

## 2022-03-15 PROCEDURE — 1160F RVW MEDS BY RX/DR IN RCRD: CPT | Mod: CPTII,S$GLB,, | Performed by: OPHTHALMOLOGY

## 2022-03-15 PROCEDURE — 1159F MED LIST DOCD IN RCRD: CPT | Mod: CPTII,S$GLB,, | Performed by: OPHTHALMOLOGY

## 2022-03-15 PROCEDURE — 99999 PR PBB SHADOW E&M-EST. PATIENT-LVL II: CPT | Mod: PBBFAC,,, | Performed by: OPHTHALMOLOGY

## 2022-03-15 NOTE — PROGRESS NOTES
HPI     Concerns About Ocular Health      Additional comments: Bump on LLL              Comments     Patient states he has been having a bump on his LLL for 6-8 weeks now. It   has gotten better from time to time.    Maria Victoria Sharma PRN OU          Last edited by Juan Angel on 3/15/2022 12:37 PM. (History)            Assessment /Plan     For exam results, see Encounter Report.    Chalazion left lower eyelid        No FMHx blindness // Glaucoma  Discussed      Chalazion LLL x few weeks  Using WCs with intermittent relief    Discussed options with Dr Luis    Continue WCs    Plan  RTC 5 days for possible Chalazion LL excision with Dr LYNDON Luis  RTC sooner prn with good understanding

## 2022-03-16 ENCOUNTER — TELEPHONE (OUTPATIENT)
Dept: OPHTHALMOLOGY | Facility: CLINIC | Age: 32
End: 2022-03-16
Payer: COMMERCIAL

## 2022-05-11 DIAGNOSIS — Z00.00 ANNUAL PHYSICAL EXAM: Primary | ICD-10-CM

## 2022-06-13 ENCOUNTER — CLINICAL SUPPORT (OUTPATIENT)
Dept: ADMINISTRATIVE | Facility: CLINIC | Age: 32
End: 2022-06-13
Payer: COMMERCIAL

## 2022-06-13 DIAGNOSIS — Z00.00 ANNUAL PHYSICAL EXAM: ICD-10-CM

## 2022-06-13 DIAGNOSIS — Z00.00 ROUTINE GENERAL MEDICAL EXAMINATION AT A HEALTH CARE FACILITY: Primary | ICD-10-CM

## 2022-06-13 LAB
25(OH)D3+25(OH)D2 SERPL-MCNC: 35 NG/ML (ref 30–96)
ALBUMIN SERPL BCP-MCNC: 4.7 G/DL (ref 3.5–5.2)
ALP SERPL-CCNC: 54 U/L (ref 55–135)
ALT SERPL W/O P-5'-P-CCNC: 34 U/L (ref 10–44)
ANION GAP SERPL CALC-SCNC: 14 MMOL/L (ref 8–16)
AST SERPL-CCNC: 29 U/L (ref 10–40)
BASOPHILS # BLD AUTO: 0.03 K/UL (ref 0–0.2)
BASOPHILS NFR BLD: 0.6 % (ref 0–1.9)
BILIRUB SERPL-MCNC: 0.9 MG/DL (ref 0.1–1)
BILIRUB UR QL STRIP: NEGATIVE
BUN SERPL-MCNC: 15 MG/DL (ref 6–20)
CALCIUM SERPL-MCNC: 10 MG/DL (ref 8.7–10.5)
CHLORIDE SERPL-SCNC: 100 MMOL/L (ref 95–110)
CHOLEST SERPL-MCNC: 167 MG/DL (ref 120–199)
CHOLEST/HDLC SERPL: 3.3 {RATIO} (ref 2–5)
CLARITY UR REFRACT.AUTO: CLEAR
CO2 SERPL-SCNC: 23 MMOL/L (ref 23–29)
COLOR UR AUTO: YELLOW
CREAT SERPL-MCNC: 1 MG/DL (ref 0.5–1.4)
DIFFERENTIAL METHOD: ABNORMAL
EOSINOPHIL # BLD AUTO: 0.2 K/UL (ref 0–0.5)
EOSINOPHIL NFR BLD: 4.2 % (ref 0–8)
ERYTHROCYTE [DISTWIDTH] IN BLOOD BY AUTOMATED COUNT: 11.8 % (ref 11.5–14.5)
EST. GFR  (AFRICAN AMERICAN): >60 ML/MIN/1.73 M^2
EST. GFR  (NON AFRICAN AMERICAN): >60 ML/MIN/1.73 M^2
ESTIMATED AVG GLUCOSE: 77 MG/DL (ref 68–131)
GLUCOSE SERPL-MCNC: 79 MG/DL (ref 70–110)
GLUCOSE UR QL STRIP: NEGATIVE
HBA1C MFR BLD: 4.3 % (ref 4–5.6)
HCT VFR BLD AUTO: 48.3 % (ref 40–54)
HDLC SERPL-MCNC: 50 MG/DL (ref 40–75)
HDLC SERPL: 29.9 % (ref 20–50)
HGB BLD-MCNC: 16.6 G/DL (ref 14–18)
HGB S BLD QL SOLY: NEGATIVE
HGB UR QL STRIP: NEGATIVE
IMM GRANULOCYTES # BLD AUTO: 0.01 K/UL (ref 0–0.04)
IMM GRANULOCYTES NFR BLD AUTO: 0.2 % (ref 0–0.5)
KETONES UR QL STRIP: NEGATIVE
LDLC SERPL CALC-MCNC: 95.6 MG/DL (ref 63–159)
LEUKOCYTE ESTERASE UR QL STRIP: NEGATIVE
LYMPHOCYTES # BLD AUTO: 1.1 K/UL (ref 1–4.8)
LYMPHOCYTES NFR BLD: 23.4 % (ref 18–48)
MCH RBC QN AUTO: 34.7 PG (ref 27–31)
MCHC RBC AUTO-ENTMCNC: 34.4 G/DL (ref 32–36)
MCV RBC AUTO: 101 FL (ref 82–98)
MONOCYTES # BLD AUTO: 0.4 K/UL (ref 0.3–1)
MONOCYTES NFR BLD: 7.7 % (ref 4–15)
NEUTROPHILS # BLD AUTO: 3.1 K/UL (ref 1.8–7.7)
NEUTROPHILS NFR BLD: 63.9 % (ref 38–73)
NITRITE UR QL STRIP: NEGATIVE
NONHDLC SERPL-MCNC: 117 MG/DL
NRBC BLD-RTO: 0 /100 WBC
PH UR STRIP: 6 [PH] (ref 5–8)
PLATELET # BLD AUTO: 174 K/UL (ref 150–450)
PMV BLD AUTO: 13 FL (ref 9.2–12.9)
POTASSIUM SERPL-SCNC: 4.1 MMOL/L (ref 3.5–5.1)
PROT SERPL-MCNC: 7.6 G/DL (ref 6–8.4)
PROT UR QL STRIP: NEGATIVE
RBC # BLD AUTO: 4.79 M/UL (ref 4.6–6.2)
SARS-COV-2 IGG SERPL IA-ACNC: NORMAL AU/ML
SARS-COV-2 IGG SERPL QL IA: POSITIVE
SODIUM SERPL-SCNC: 137 MMOL/L (ref 136–145)
SP GR UR STRIP: 1.01 (ref 1–1.03)
TRIGL SERPL-MCNC: 107 MG/DL (ref 30–150)
TSH SERPL DL<=0.005 MIU/L-ACNC: 1.09 UIU/ML (ref 0.4–4)
URN SPEC COLLECT METH UR: NORMAL
WBC # BLD AUTO: 4.78 K/UL (ref 3.9–12.7)

## 2022-06-13 PROCEDURE — 93010 EKG 12-LEAD: ICD-10-PCS | Mod: S$GLB,,, | Performed by: INTERNAL MEDICINE

## 2022-06-13 PROCEDURE — 82955 ASSAY OF G6PD ENZYME: CPT | Performed by: INTERNAL MEDICINE

## 2022-06-13 PROCEDURE — 83036 HEMOGLOBIN GLYCOSYLATED A1C: CPT | Performed by: INTERNAL MEDICINE

## 2022-06-13 PROCEDURE — 80053 COMPREHEN METABOLIC PANEL: CPT | Performed by: INTERNAL MEDICINE

## 2022-06-13 PROCEDURE — 82306 VITAMIN D 25 HYDROXY: CPT | Performed by: INTERNAL MEDICINE

## 2022-06-13 PROCEDURE — 80061 LIPID PANEL: CPT | Performed by: INTERNAL MEDICINE

## 2022-06-13 PROCEDURE — 86769 SARS-COV-2 COVID-19 ANTIBODY: CPT | Performed by: INTERNAL MEDICINE

## 2022-06-13 PROCEDURE — 84443 ASSAY THYROID STIM HORMONE: CPT | Performed by: INTERNAL MEDICINE

## 2022-06-13 PROCEDURE — 85025 COMPLETE CBC W/AUTO DIFF WBC: CPT | Performed by: INTERNAL MEDICINE

## 2022-06-13 PROCEDURE — 85660 RBC SICKLE CELL TEST: CPT | Performed by: INTERNAL MEDICINE

## 2022-06-13 PROCEDURE — 81003 URINALYSIS AUTO W/O SCOPE: CPT | Performed by: INTERNAL MEDICINE

## 2022-06-13 PROCEDURE — 93010 ELECTROCARDIOGRAM REPORT: CPT | Mod: S$GLB,,, | Performed by: INTERNAL MEDICINE

## 2022-06-14 LAB — G6PD RBC-CCNT: 0.8 U/G HB (ref 8–11.9)

## 2022-11-14 ENCOUNTER — OFFICE VISIT (OUTPATIENT)
Dept: OPHTHALMOLOGY | Facility: CLINIC | Age: 32
End: 2022-11-14
Payer: COMMERCIAL

## 2022-11-14 ENCOUNTER — HOSPITAL ENCOUNTER (OUTPATIENT)
Dept: RADIOLOGY | Facility: HOSPITAL | Age: 32
Discharge: HOME | End: 2022-11-14
Attending: OPHTHALMOLOGY
Payer: COMMERCIAL

## 2022-11-14 DIAGNOSIS — S05.8X2A BLUNT TRAUMA OF LEFT EYE, INITIAL ENCOUNTER: ICD-10-CM

## 2022-11-14 DIAGNOSIS — S02.832A FRACTURE OF MEDIAL ORBITAL WALL, LEFT SIDE, INITIAL ENCOUNTER FOR CLOSED FRACTURE: ICD-10-CM

## 2022-11-14 DIAGNOSIS — S05.8X2A BLUNT TRAUMA OF LEFT EYE, INITIAL ENCOUNTER: Primary | ICD-10-CM

## 2022-11-14 DIAGNOSIS — H11.32 SUBCONJUNCTIVAL HEMORRHAGE, TRAUMATIC, LEFT: ICD-10-CM

## 2022-11-14 DIAGNOSIS — H53.2 DIPLOPIA: ICD-10-CM

## 2022-11-14 PROCEDURE — 70482 CT ORBIT/EAR/FOSSA W/O&W/DYE: CPT | Mod: TC

## 2022-11-14 PROCEDURE — 25500020 PHARM REV CODE 255: Performed by: OPHTHALMOLOGY

## 2022-11-14 PROCEDURE — 99214 PR OFFICE/OUTPT VISIT, EST, LEVL IV, 30-39 MIN: ICD-10-PCS | Mod: S$GLB,,, | Performed by: OPHTHALMOLOGY

## 2022-11-14 PROCEDURE — 99999 PR PBB SHADOW E&M-EST. PATIENT-LVL III: ICD-10-PCS | Mod: PBBFAC,,, | Performed by: OPHTHALMOLOGY

## 2022-11-14 PROCEDURE — 70482 CT ORBIT/EAR/FOSSA W/O&W/DYE: CPT | Mod: 26,,, | Performed by: RADIOLOGY

## 2022-11-14 PROCEDURE — 70482 CT ORBITS W/WO CONTRAST: ICD-10-PCS | Mod: 26,,, | Performed by: RADIOLOGY

## 2022-11-14 PROCEDURE — 99999 PR PBB SHADOW E&M-EST. PATIENT-LVL III: CPT | Mod: PBBFAC,,, | Performed by: OPHTHALMOLOGY

## 2022-11-14 PROCEDURE — 99214 OFFICE O/P EST MOD 30 MIN: CPT | Mod: S$GLB,,, | Performed by: OPHTHALMOLOGY

## 2022-11-14 RX ORDER — METHYLPREDNISOLONE 8 MG/1
8 TABLET ORAL SEE ADMIN INSTRUCTIONS
Qty: 28 TABLET | Refills: 0 | Status: SHIPPED | OUTPATIENT
Start: 2022-11-14

## 2022-11-14 RX ORDER — CEPHALEXIN 500 MG/1
500 CAPSULE ORAL 4 TIMES DAILY
COMMUNITY
End: 2022-11-14

## 2022-11-14 RX ORDER — CEPHALEXIN 500 MG/1
500 CAPSULE ORAL 4 TIMES DAILY
Qty: 28 CAPSULE | Refills: 0 | Status: SHIPPED | OUTPATIENT
Start: 2022-11-14

## 2022-11-14 RX ORDER — METHYLPREDNISOLONE 8 MG/1
8 TABLET ORAL DAILY
COMMUNITY
End: 2022-11-14

## 2022-11-14 RX ORDER — METHYLPREDNISOLONE 4 MG/1
8 TABLET ORAL DAILY
COMMUNITY
End: 2022-11-14

## 2022-11-14 RX ORDER — CEPHALEXIN 250 MG/1
250 CAPSULE ORAL 4 TIMES DAILY
COMMUNITY
End: 2022-11-14

## 2022-11-14 RX ORDER — CEPHALEXIN 250 MG/1
250 CAPSULE ORAL 4 TIMES DAILY
Qty: 28 CAPSULE | Refills: 0 | Status: CANCELLED | OUTPATIENT
Start: 2022-11-14

## 2022-11-14 RX ADMIN — IOHEXOL 100 ML: 350 INJECTION, SOLUTION INTRAVENOUS at 10:11

## 2022-11-14 NOTE — PROGRESS NOTES
HPI    DLS: 03/15/2022  31 yo male   Blunt trauma to the left eye yesterday about 12:00. -finger to the eye   while playing football  Pain 6-7 especially when he turns the eye to the right   Horizontal Diplopia was present for about 90 minutes after injury-has   since subsided.   Last edited by Maddie Perales MA on 11/14/2022  8:59 AM.            Assessment /Plan     For exam results, see Encounter Report.    Blunt trauma of left eye, initial encounter  -     CT Orbits W/WO Contrast; Future; Expected date: 11/14/2022    Subconjunctival hemorrhage, traumatic, left  -     CT Orbits W/WO Contrast; Future; Expected date: 11/14/2022    Fracture of medial orbital wall, left side, initial encounter for closed fracture  -     CT Orbits W/WO Contrast; Future; Expected date: 11/14/2022    Diplopia  -     CT Orbits W/WO Contrast; Future; Expected date: 11/14/2022      No FMHx blindness // Glaucoma      Blunt trauma to Left upper lid during game play 11/13/2022  + horizontal diplopia during @ 45 minutes of game following injury  + Pain with eye movements  + Exophoria OS with Right gaze sc diplopia  + Epistaxis  No HA  No apparent Rhinorrhea      Left Medial wall fx confirmed with CT Scan orbits  11/14/2022  + Prolapsed orbital fat into ethmoid sinus  Cribriform intact    Right eye / orbit  Old / remote medial wall Fracture  Observation      EXAMINATION:  CT ORBITS W/WO CONTRAST 11/14/2022     CLINICAL HISTORY:  Diplopia;Blunt trauma to left orbit with Epistaxis & diplopia, suspect left media wall fracture and medial rectus involvment;  Other injuries of left eye and orbit, initial encounter     TECHNIQUE:  2.5 mm axial images of the orbits with and without contrast with coronal reformatted imaging from the axial acquisition 100 m of Omnipaque 350 contrast was infused intravenously.    There is extra conal orbital emphysema along the medial aspect left orbit with superimposed large fracture deformity of the left medial  orbital wall with extension of extraconal fat into the fracture cleft. Please note the medial rectus muscle approximates the fracture cleft concerning for component of extraocular muscle entrapment and clinical correlation advised.    There is superimposed remote fracture deformity of the right lamina papyracea with extension of extraconal fat into the fracture cleft.     There is moderate opacification of the left ethmoid air cells anterior to the fracture which may represent hemorrhage.    Please note there is superimposed remote fracture deformity of the right medial orbital wall extra conal fat filling the fracture cleft.          Plan  Reviewed exam and findings with patient  Rediscussed & reviewed with Oculoplastics / Jessica Luis    Will manage conservatively  EES alvaro to lid abrasions  [place] next 24-48 hours    Orbital wall fracture precautions  No Nose blowing  Ocean spray nasal spray  Keflex 500 mg PO QID x 7 days  Medrol Dosepak 8 mg tablets --> (double dose of 4 mg tabs) for 6 days  Limited activity and no game play this week including Sunday game 11/20/2022  Going forward use eye shield for added protection    RTC Introduced and fu with Dr Luis next Monday 11/21/2022  RTC sooner prn with good understanding

## 2022-11-21 ENCOUNTER — TELEPHONE (OUTPATIENT)
Dept: OPHTHALMOLOGY | Facility: CLINIC | Age: 32
End: 2022-11-21
Payer: COMMERCIAL

## 2022-11-21 ENCOUNTER — OFFICE VISIT (OUTPATIENT)
Dept: OPHTHALMOLOGY | Facility: CLINIC | Age: 32
End: 2022-11-21
Payer: COMMERCIAL

## 2022-11-21 ENCOUNTER — HOSPITAL ENCOUNTER (OUTPATIENT)
Dept: RADIOLOGY | Facility: HOSPITAL | Age: 32
Discharge: HOME | End: 2022-11-21
Attending: OPHTHALMOLOGY
Payer: COMMERCIAL

## 2022-11-21 DIAGNOSIS — S02.832S: ICD-10-CM

## 2022-11-21 DIAGNOSIS — S02.832A FRACTURE OF MEDIAL ORBITAL WALL, LEFT SIDE, INITIAL ENCOUNTER FOR CLOSED FRACTURE: Primary | ICD-10-CM

## 2022-11-21 DIAGNOSIS — S02.832S: Primary | ICD-10-CM

## 2022-11-21 DIAGNOSIS — H11.32 SUBCONJUNCTIVAL HEMORRHAGE, TRAUMATIC, LEFT: ICD-10-CM

## 2022-11-21 PROCEDURE — 70480 CT ORBIT/EAR/FOSSA W/O DYE: CPT | Mod: 26,,, | Performed by: RADIOLOGY

## 2022-11-21 PROCEDURE — 99999 PR PBB SHADOW E&M-EST. PATIENT-LVL II: ICD-10-PCS | Mod: PBBFAC,,, | Performed by: OPHTHALMOLOGY

## 2022-11-21 PROCEDURE — 99999 PR PBB SHADOW E&M-EST. PATIENT-LVL II: CPT | Mod: PBBFAC,,, | Performed by: OPHTHALMOLOGY

## 2022-11-21 PROCEDURE — 70480 CT ORBITS SELLA POST FOSSA WITHOUT CONT: ICD-10-PCS | Mod: 26,,, | Performed by: RADIOLOGY

## 2022-11-21 PROCEDURE — 99214 OFFICE O/P EST MOD 30 MIN: CPT | Mod: S$GLB,,, | Performed by: OPHTHALMOLOGY

## 2022-11-21 PROCEDURE — 70480 CT ORBIT/EAR/FOSSA W/O DYE: CPT | Mod: TC

## 2022-11-21 PROCEDURE — 99214 PR OFFICE/OUTPT VISIT, EST, LEVL IV, 30-39 MIN: ICD-10-PCS | Mod: S$GLB,,, | Performed by: OPHTHALMOLOGY

## 2022-11-21 NOTE — PROGRESS NOTES
HPI    Patient here for orbit fracture f/u  Pt doing well no pain today  No f/f or double va  No nausea/vomiting   Last edited by Amber Stringer on 11/21/2022 11:08 AM.            Assessment /Plan     For exam results, see Encounter Report.    Fracture of medial orbital wall, left side, subsequent encounter for closed fracture    Subconjunctival hemorrhage, traumatic, left      The patient is a pleasant 32-year-old male here for follow-up of left medial orbital wall fracture.  The patient is asymptomatic at this time. He does not have any diplopia or pain with eye movement.     CT Maxillofacial reviewed: Left lamina papyracea fracture with herniation of orbital fat into ethmoid sinus. Improved positioning of medial rectus compared to prior study. Old right medial wall fracture.     On exam, slight limitation in upgaze. No limitation in lateral gaze. No gross enophthalmos.     Patient is cleared to resume tiered activity starting today as long as he remains asymptomatic. Discussed that he should let us know immediately if he has onset of diplopia, eye pain, or decreased vision while resuming his workouts. Patient will wear eye shield while playing as he is healing to protect his eye and orbit. No nose blowing for another week.     No need for surgical intervention at this time. Discussed that he may develop enophthalmos which may need to be corrected at some point in the future.     All questions answered.     Return prn

## 2022-12-06 ENCOUNTER — HOSPITAL ENCOUNTER (OUTPATIENT)
Dept: RADIOLOGY | Facility: HOSPITAL | Age: 32
Discharge: HOME | End: 2022-12-06
Attending: ORTHOPAEDIC SURGERY
Payer: COMMERCIAL

## 2022-12-06 ENCOUNTER — TELEPHONE (OUTPATIENT)
Dept: SPORTS MEDICINE | Facility: CLINIC | Age: 32
End: 2022-12-06
Payer: COMMERCIAL

## 2022-12-06 DIAGNOSIS — M25.522 LEFT ELBOW PAIN: ICD-10-CM

## 2022-12-06 DIAGNOSIS — M25.572 PAIN OF JOINT OF LEFT ANKLE AND FOOT: ICD-10-CM

## 2022-12-06 DIAGNOSIS — M25.572 PAIN OF JOINT OF LEFT ANKLE AND FOOT: Primary | ICD-10-CM

## 2022-12-06 PROCEDURE — 73721 MRI JNT OF LWR EXTRE W/O DYE: CPT | Mod: TC,LT

## 2022-12-06 PROCEDURE — 73610 XR ANKLE COMPLETE 3 VIEW LEFT: ICD-10-PCS | Mod: 26,LT,, | Performed by: RADIOLOGY

## 2022-12-06 PROCEDURE — 73080 XR ELBOW COMPLETE 3 VIEW LEFT: ICD-10-PCS | Mod: 26,LT,, | Performed by: RADIOLOGY

## 2022-12-06 PROCEDURE — 73721 MRI ANKLE WITHOUT CONTRAST LEFT: ICD-10-PCS | Mod: 26,LT,, | Performed by: RADIOLOGY

## 2022-12-06 PROCEDURE — 73610 X-RAY EXAM OF ANKLE: CPT | Mod: TC,LT

## 2022-12-06 PROCEDURE — 73610 X-RAY EXAM OF ANKLE: CPT | Mod: 26,LT,, | Performed by: RADIOLOGY

## 2022-12-06 PROCEDURE — 73080 X-RAY EXAM OF ELBOW: CPT | Mod: 26,LT,, | Performed by: RADIOLOGY

## 2022-12-06 PROCEDURE — 73080 X-RAY EXAM OF ELBOW: CPT | Mod: TC,LT

## 2022-12-06 PROCEDURE — 73721 MRI JNT OF LWR EXTRE W/O DYE: CPT | Mod: 26,LT,, | Performed by: RADIOLOGY

## 2023-01-09 ENCOUNTER — TELEPHONE (OUTPATIENT)
Dept: SPORTS MEDICINE | Facility: CLINIC | Age: 33
End: 2023-01-09
Payer: COMMERCIAL

## 2023-01-09 DIAGNOSIS — M54.9 DORSALGIA, UNSPECIFIED: Primary | ICD-10-CM

## 2023-01-11 ENCOUNTER — HOSPITAL ENCOUNTER (OUTPATIENT)
Dept: RADIOLOGY | Facility: HOSPITAL | Age: 33
Discharge: HOME | End: 2023-01-11
Attending: ORTHOPAEDIC SURGERY

## 2023-01-11 DIAGNOSIS — M54.9 DORSALGIA, UNSPECIFIED: ICD-10-CM

## 2023-01-11 PROCEDURE — 72148 MRI LUMBAR SPINE WITHOUT CONTRAST: ICD-10-PCS | Mod: 26,,, | Performed by: RADIOLOGY

## 2023-01-11 PROCEDURE — 72148 MRI LUMBAR SPINE W/O DYE: CPT | Mod: 26,,, | Performed by: RADIOLOGY

## 2023-01-11 PROCEDURE — 72114 X-RAY EXAM L-S SPINE BENDING: CPT | Mod: TC

## 2023-01-11 PROCEDURE — 72148 MRI LUMBAR SPINE W/O DYE: CPT | Mod: TC

## 2023-01-11 PROCEDURE — 72114 X-RAY EXAM L-S SPINE BENDING: CPT | Mod: 26,,, | Performed by: RADIOLOGY

## 2023-01-11 PROCEDURE — 72114 XR LUMBAR SPINE 5 VIEW WITH FLEX AND EXT: ICD-10-PCS | Mod: 26,,, | Performed by: RADIOLOGY

## 2023-05-11 ENCOUNTER — CLINICAL SUPPORT (OUTPATIENT)
Dept: ADMINISTRATIVE | Facility: CLINIC | Age: 33
End: 2023-05-11
Payer: COMMERCIAL

## 2023-05-11 DIAGNOSIS — Z00.00 ROUTINE GENERAL MEDICAL EXAMINATION AT A HEALTH CARE FACILITY: Primary | ICD-10-CM

## 2023-05-11 LAB
25(OH)D3+25(OH)D2 SERPL-MCNC: 24 NG/ML (ref 30–96)
ALBUMIN SERPL BCP-MCNC: 4.3 G/DL (ref 3.5–5.2)
ALP SERPL-CCNC: 52 U/L (ref 55–135)
ALT SERPL W/O P-5'-P-CCNC: 42 U/L (ref 10–44)
ANION GAP SERPL CALC-SCNC: 10 MMOL/L (ref 8–16)
AST SERPL-CCNC: 41 U/L (ref 10–40)
BASOPHILS # BLD AUTO: 0.02 K/UL (ref 0–0.2)
BASOPHILS NFR BLD: 0.4 % (ref 0–1.9)
BILIRUB SERPL-MCNC: 0.7 MG/DL (ref 0.1–1)
BILIRUB UR QL STRIP: NEGATIVE
BUN SERPL-MCNC: 18 MG/DL (ref 6–20)
CALCIUM SERPL-MCNC: 9.5 MG/DL (ref 8.7–10.5)
CHLORIDE SERPL-SCNC: 105 MMOL/L (ref 95–110)
CHOLEST SERPL-MCNC: 193 MG/DL (ref 120–199)
CHOLEST/HDLC SERPL: 4.2 {RATIO} (ref 2–5)
CLARITY UR REFRACT.AUTO: CLEAR
CO2 SERPL-SCNC: 26 MMOL/L (ref 23–29)
COLOR UR AUTO: YELLOW
CREAT SERPL-MCNC: 1 MG/DL (ref 0.5–1.4)
DIFFERENTIAL METHOD: ABNORMAL
EOSINOPHIL # BLD AUTO: 0.4 K/UL (ref 0–0.5)
EOSINOPHIL NFR BLD: 7 % (ref 0–8)
ERYTHROCYTE [DISTWIDTH] IN BLOOD BY AUTOMATED COUNT: 12.8 % (ref 11.5–14.5)
EST. GFR  (NO RACE VARIABLE): >60 ML/MIN/1.73 M^2
ESTIMATED AVG GLUCOSE: 74 MG/DL (ref 68–131)
GLUCOSE SERPL-MCNC: 81 MG/DL (ref 70–110)
GLUCOSE UR QL STRIP: NEGATIVE
HBA1C MFR BLD: 4.2 % (ref 4–5.6)
HCT VFR BLD AUTO: 47.2 % (ref 40–54)
HDLC SERPL-MCNC: 46 MG/DL (ref 40–75)
HDLC SERPL: 23.8 % (ref 20–50)
HGB BLD-MCNC: 15.4 G/DL (ref 14–18)
HGB UR QL STRIP: NEGATIVE
IMM GRANULOCYTES # BLD AUTO: 0 K/UL (ref 0–0.04)
IMM GRANULOCYTES NFR BLD AUTO: 0 % (ref 0–0.5)
KETONES UR QL STRIP: NEGATIVE
LDLC SERPL CALC-MCNC: 120.4 MG/DL (ref 63–159)
LEUKOCYTE ESTERASE UR QL STRIP: NEGATIVE
LYMPHOCYTES # BLD AUTO: 1.5 K/UL (ref 1–4.8)
LYMPHOCYTES NFR BLD: 28.5 % (ref 18–48)
MCH RBC QN AUTO: 32.8 PG (ref 27–31)
MCHC RBC AUTO-ENTMCNC: 32.6 G/DL (ref 32–36)
MCV RBC AUTO: 100 FL (ref 82–98)
MONOCYTES # BLD AUTO: 0.4 K/UL (ref 0.3–1)
MONOCYTES NFR BLD: 8.5 % (ref 4–15)
NEUTROPHILS # BLD AUTO: 2.9 K/UL (ref 1.8–7.7)
NEUTROPHILS NFR BLD: 55.6 % (ref 38–73)
NITRITE UR QL STRIP: NEGATIVE
NONHDLC SERPL-MCNC: 147 MG/DL
NRBC BLD-RTO: 0 /100 WBC
PH UR STRIP: 7 [PH] (ref 5–8)
PLATELET # BLD AUTO: 187 K/UL (ref 150–450)
PMV BLD AUTO: 11.4 FL (ref 9.2–12.9)
POTASSIUM SERPL-SCNC: 3.9 MMOL/L (ref 3.5–5.1)
PROT SERPL-MCNC: 7.3 G/DL (ref 6–8.4)
PROT UR QL STRIP: ABNORMAL
RBC # BLD AUTO: 4.7 M/UL (ref 4.6–6.2)
SODIUM SERPL-SCNC: 141 MMOL/L (ref 136–145)
SP GR UR STRIP: 1.03 (ref 1–1.03)
TRIGL SERPL-MCNC: 133 MG/DL (ref 30–150)
TSH SERPL DL<=0.005 MIU/L-ACNC: 0.6 UIU/ML (ref 0.4–4)
URN SPEC COLLECT METH UR: ABNORMAL
WBC # BLD AUTO: 5.15 K/UL (ref 3.9–12.7)

## 2023-05-11 PROCEDURE — 82306 VITAMIN D 25 HYDROXY: CPT | Performed by: INTERNAL MEDICINE

## 2023-05-11 PROCEDURE — 93010 ELECTROCARDIOGRAM REPORT: CPT | Mod: S$GLB,,, | Performed by: INTERNAL MEDICINE

## 2023-05-11 PROCEDURE — 93005 ELECTROCARDIOGRAM TRACING: CPT | Mod: S$GLB,,, | Performed by: INTERNAL MEDICINE

## 2023-05-11 PROCEDURE — 93005 EKG 12-LEAD: ICD-10-PCS | Mod: S$GLB,,, | Performed by: INTERNAL MEDICINE

## 2023-05-11 PROCEDURE — 36415 COLL VENOUS BLD VENIPUNCTURE: CPT | Performed by: INTERNAL MEDICINE

## 2023-05-11 PROCEDURE — 85025 COMPLETE CBC W/AUTO DIFF WBC: CPT | Performed by: INTERNAL MEDICINE

## 2023-05-11 PROCEDURE — 81003 URINALYSIS AUTO W/O SCOPE: CPT | Performed by: INTERNAL MEDICINE

## 2023-05-11 PROCEDURE — 80053 COMPREHEN METABOLIC PANEL: CPT | Performed by: INTERNAL MEDICINE

## 2023-05-11 PROCEDURE — 84443 ASSAY THYROID STIM HORMONE: CPT | Performed by: INTERNAL MEDICINE

## 2023-05-11 PROCEDURE — 83036 HEMOGLOBIN GLYCOSYLATED A1C: CPT | Performed by: INTERNAL MEDICINE

## 2023-05-11 PROCEDURE — 93010 EKG 12-LEAD: ICD-10-PCS | Mod: S$GLB,,, | Performed by: INTERNAL MEDICINE

## 2023-05-11 PROCEDURE — 80061 LIPID PANEL: CPT | Performed by: INTERNAL MEDICINE

## 2023-07-13 RX ORDER — CELECOXIB 200 MG/1
200 CAPSULE ORAL DAILY
Qty: 30 CAPSULE | Refills: 0 | Status: SHIPPED | OUTPATIENT
Start: 2023-07-13 | End: 2023-08-12

## 2023-09-06 RX ORDER — CELECOXIB 200 MG/1
200 CAPSULE ORAL DAILY
Qty: 30 CAPSULE | Refills: 0 | Status: SHIPPED | OUTPATIENT
Start: 2023-09-06 | End: 2023-10-06

## 2023-10-11 RX ORDER — CYCLOBENZAPRINE HCL 10 MG
10 TABLET ORAL 3 TIMES DAILY PRN
Qty: 10 TABLET | Refills: 0 | Status: SHIPPED | OUTPATIENT
Start: 2023-10-11 | End: 2023-12-26 | Stop reason: SDUPTHER

## 2023-10-16 ENCOUNTER — TELEPHONE (OUTPATIENT)
Dept: SPORTS MEDICINE | Facility: CLINIC | Age: 33
End: 2023-10-16
Payer: COMMERCIAL

## 2023-10-16 DIAGNOSIS — M54.9 DORSALGIA, UNSPECIFIED: Primary | ICD-10-CM

## 2023-11-26 ENCOUNTER — TELEPHONE (OUTPATIENT)
Dept: SPORTS MEDICINE | Facility: CLINIC | Age: 33
End: 2023-11-26
Payer: COMMERCIAL

## 2023-11-26 DIAGNOSIS — M25.572 PAIN OF JOINT OF LEFT ANKLE AND FOOT: Primary | ICD-10-CM

## 2023-11-26 DIAGNOSIS — S89.92XA INJURY OF LEFT LOWER EXTREMITY, INITIAL ENCOUNTER: ICD-10-CM

## 2023-11-27 ENCOUNTER — HOSPITAL ENCOUNTER (OUTPATIENT)
Dept: RADIOLOGY | Facility: HOSPITAL | Age: 33
Discharge: HOME | End: 2023-11-27
Attending: ORTHOPAEDIC SURGERY
Payer: COMMERCIAL

## 2023-11-27 ENCOUNTER — OFFICE VISIT (OUTPATIENT)
Dept: SPORTS MEDICINE | Facility: CLINIC | Age: 33
End: 2023-11-27
Payer: COMMERCIAL

## 2023-11-27 DIAGNOSIS — S89.92XA INJURY OF LEFT LOWER EXTREMITY, INITIAL ENCOUNTER: ICD-10-CM

## 2023-11-27 DIAGNOSIS — M25.572 PAIN OF JOINT OF LEFT ANKLE AND FOOT: ICD-10-CM

## 2023-11-27 DIAGNOSIS — M25.572 ACUTE LEFT ANKLE PAIN: ICD-10-CM

## 2023-11-27 DIAGNOSIS — S43.60XA: ICD-10-CM

## 2023-11-27 DIAGNOSIS — M25.572 ACUTE LEFT ANKLE PAIN: Primary | ICD-10-CM

## 2023-11-27 PROCEDURE — 73721 MRI ANKLE WITHOUT CONTRAST LEFT: ICD-10-PCS | Mod: 26,LT,, | Performed by: RADIOLOGY

## 2023-11-27 PROCEDURE — 73721 MRI JNT OF LWR EXTRE W/O DYE: CPT | Mod: 26,LT,, | Performed by: RADIOLOGY

## 2023-11-27 PROCEDURE — 73718 MRI TIBIA FIBULA WITHOUT CONTRAST LEFT: ICD-10-PCS | Mod: 26,LT,, | Performed by: RADIOLOGY

## 2023-11-27 PROCEDURE — 73600 X-RAY EXAM OF ANKLE: CPT | Mod: TC,LT

## 2023-11-27 PROCEDURE — 99999 PR PBB SHADOW E&M-EST. PATIENT-LVL I: CPT | Mod: PBBFAC,,, | Performed by: ORTHOPAEDIC SURGERY

## 2023-11-27 PROCEDURE — 73718 MRI LOWER EXTREMITY W/O DYE: CPT | Mod: TC,LT

## 2023-11-27 PROCEDURE — 20605 DRAIN/INJ JOINT/BURSA W/O US: CPT | Mod: 59,51,LT,S$GLB | Performed by: ORTHOPAEDIC SURGERY

## 2023-11-27 PROCEDURE — 20606 DRAIN/INJ JOINT/BURSA W/US: CPT | Mod: LT,S$GLB,, | Performed by: ORTHOPAEDIC SURGERY

## 2023-11-27 PROCEDURE — 73610 X-RAY EXAM OF ANKLE: CPT | Mod: TC,50

## 2023-11-27 PROCEDURE — 99499 UNLISTED E&M SERVICE: CPT | Mod: S$GLB,,, | Performed by: ORTHOPAEDIC SURGERY

## 2023-11-27 PROCEDURE — 73721 MRI JNT OF LWR EXTRE W/O DYE: CPT | Mod: TC,LT

## 2023-11-27 PROCEDURE — 73718 MRI LOWER EXTREMITY W/O DYE: CPT | Mod: 26,LT,, | Performed by: RADIOLOGY

## 2023-11-27 PROCEDURE — 99999 PR PBB SHADOW E&M-EST. PATIENT-LVL I: ICD-10-PCS | Mod: PBBFAC,,, | Performed by: ORTHOPAEDIC SURGERY

## 2023-11-27 PROCEDURE — 73610 X-RAY EXAM OF ANKLE: CPT | Mod: 26,50,, | Performed by: RADIOLOGY

## 2023-11-27 PROCEDURE — 73610 XR ANKLE COMPLETE 3 VIEW BILATERAL: ICD-10-PCS | Mod: 26,50,, | Performed by: RADIOLOGY

## 2023-11-27 PROCEDURE — 73600 X-RAY EXAM OF ANKLE: CPT | Mod: 26,LT,, | Performed by: RADIOLOGY

## 2023-11-27 PROCEDURE — 20606 INTERMEDIATE JOINT ASPIRATION/INJECTION: L ANKLE: ICD-10-PCS | Mod: LT,S$GLB,, | Performed by: ORTHOPAEDIC SURGERY

## 2023-11-27 PROCEDURE — 73600 XR ANKLE 2 VIEW LEFT: ICD-10-PCS | Mod: 26,LT,, | Performed by: RADIOLOGY

## 2023-11-27 PROCEDURE — 20605 INTERMEDIATE JOINT ASPIRATION/INJECTION: ICD-10-PCS | Mod: 59,51,LT,S$GLB | Performed by: ORTHOPAEDIC SURGERY

## 2023-11-27 PROCEDURE — 99499 NO LOS: ICD-10-PCS | Mod: S$GLB,,, | Performed by: ORTHOPAEDIC SURGERY

## 2023-11-27 RX ORDER — METHYLPREDNISOLONE ACETATE 40 MG/ML
40 INJECTION, SUSPENSION INTRA-ARTICULAR; INTRALESIONAL; INTRAMUSCULAR; SOFT TISSUE
Status: DISCONTINUED | OUTPATIENT
Start: 2023-11-27 | End: 2023-11-27 | Stop reason: HOSPADM

## 2023-11-27 RX ADMIN — METHYLPREDNISOLONE ACETATE 40 MG: 40 INJECTION, SUSPENSION INTRA-ARTICULAR; INTRALESIONAL; INTRAMUSCULAR; SOFT TISSUE at 01:11

## 2023-12-10 ENCOUNTER — TELEPHONE (OUTPATIENT)
Dept: SPORTS MEDICINE | Facility: CLINIC | Age: 33
End: 2023-12-10
Payer: COMMERCIAL

## 2023-12-10 DIAGNOSIS — M25.572 ACUTE LEFT ANKLE PAIN: Primary | ICD-10-CM

## 2023-12-11 ENCOUNTER — HOSPITAL ENCOUNTER (OUTPATIENT)
Dept: RADIOLOGY | Facility: HOSPITAL | Age: 33
Discharge: HOME | End: 2023-12-11
Attending: ORTHOPAEDIC SURGERY
Payer: COMMERCIAL

## 2023-12-11 DIAGNOSIS — M25.572 ACUTE LEFT ANKLE PAIN: ICD-10-CM

## 2023-12-11 PROCEDURE — 73721 MRI JNT OF LWR EXTRE W/O DYE: CPT | Mod: TC,LT

## 2023-12-11 PROCEDURE — 73721 MRI JNT OF LWR EXTRE W/O DYE: CPT | Mod: 26,LT,, | Performed by: RADIOLOGY

## 2023-12-11 PROCEDURE — 73721 MRI ANKLE WITHOUT CONTRAST LEFT: ICD-10-PCS | Mod: 26,LT,, | Performed by: RADIOLOGY

## 2023-12-26 ENCOUNTER — TELEPHONE (OUTPATIENT)
Dept: SPORTS MEDICINE | Facility: CLINIC | Age: 33
End: 2023-12-26
Payer: COMMERCIAL

## 2023-12-26 ENCOUNTER — HOSPITAL ENCOUNTER (OUTPATIENT)
Dept: RADIOLOGY | Facility: HOSPITAL | Age: 33
Discharge: HOME | End: 2023-12-26
Attending: ORTHOPAEDIC SURGERY
Payer: COMMERCIAL

## 2023-12-26 DIAGNOSIS — M54.2 CERVICALGIA: Primary | ICD-10-CM

## 2023-12-26 DIAGNOSIS — M54.2 CERVICALGIA: ICD-10-CM

## 2023-12-26 PROCEDURE — 72141 MRI NECK SPINE W/O DYE: CPT | Mod: TC

## 2023-12-26 PROCEDURE — 72141 MRI NECK SPINE W/O DYE: CPT | Mod: 26,,, | Performed by: RADIOLOGY

## 2023-12-26 PROCEDURE — 72141 MRI CERVICAL SPINE WITHOUT CONTRAST: ICD-10-PCS | Mod: 26,,, | Performed by: RADIOLOGY

## 2023-12-26 PROCEDURE — 72052 XR CERVICAL SPINE 5 VIEW WITH FLEX AND EXT: ICD-10-PCS | Mod: 26,,, | Performed by: RADIOLOGY

## 2023-12-26 PROCEDURE — 72052 X-RAY EXAM NECK SPINE 6/>VWS: CPT | Mod: 26,,, | Performed by: RADIOLOGY

## 2023-12-26 PROCEDURE — 72052 X-RAY EXAM NECK SPINE 6/>VWS: CPT | Mod: TC

## 2023-12-26 RX ORDER — CYCLOBENZAPRINE HCL 10 MG
10 TABLET ORAL 3 TIMES DAILY PRN
Qty: 10 TABLET | Refills: 0 | Status: SHIPPED | OUTPATIENT
Start: 2023-12-26

## 2023-12-26 RX ORDER — METHYLPREDNISOLONE 4 MG/1
TABLET ORAL
Qty: 1 EACH | Refills: 0 | Status: SHIPPED | OUTPATIENT
Start: 2023-12-26

## 2024-01-09 ENCOUNTER — OFFICE VISIT (OUTPATIENT)
Dept: INFECTIOUS DISEASES | Facility: CLINIC | Age: 34
End: 2024-01-09
Payer: COMMERCIAL

## 2024-01-09 ENCOUNTER — CLINICAL SUPPORT (OUTPATIENT)
Dept: INFECTIOUS DISEASES | Facility: CLINIC | Age: 34
End: 2024-01-09
Payer: COMMERCIAL

## 2024-01-09 VITALS — WEIGHT: 293.63 LBS | HEIGHT: 76 IN | BODY MASS INDEX: 35.76 KG/M2

## 2024-01-09 DIAGNOSIS — Z71.84 TRAVEL ADVICE ENCOUNTER: Primary | ICD-10-CM

## 2024-01-09 PROCEDURE — 99402 PREV MED CNSL INDIV APPRX 30: CPT | Mod: S$GLB,,, | Performed by: INTERNAL MEDICINE

## 2024-01-09 PROCEDURE — 90717 YELLOW FEVER VACCINE SUBQ: CPT | Mod: S$GLB,,, | Performed by: INTERNAL MEDICINE

## 2024-01-09 PROCEDURE — 90471 IMMUNIZATION ADMIN: CPT | Mod: S$GLB,,, | Performed by: INTERNAL MEDICINE

## 2024-01-09 PROCEDURE — 90691 TYPHOID VACCINE IM: CPT | Mod: S$GLB,,, | Performed by: INTERNAL MEDICINE

## 2024-01-09 PROCEDURE — 99999 PR PBB SHADOW E&M-EST. PATIENT-LVL II: CPT | Mod: PBBFAC,,, | Performed by: INTERNAL MEDICINE

## 2024-01-09 PROCEDURE — 3008F BODY MASS INDEX DOCD: CPT | Mod: CPTII,S$GLB,, | Performed by: INTERNAL MEDICINE

## 2024-01-09 PROCEDURE — 90632 HEPA VACCINE ADULT IM: CPT | Mod: S$GLB,,, | Performed by: INTERNAL MEDICINE

## 2024-01-09 PROCEDURE — 90715 TDAP VACCINE 7 YRS/> IM: CPT | Mod: S$GLB,,, | Performed by: INTERNAL MEDICINE

## 2024-01-09 PROCEDURE — 90472 IMMUNIZATION ADMIN EACH ADD: CPT | Mod: S$GLB,,, | Performed by: INTERNAL MEDICINE

## 2024-01-09 PROCEDURE — 99999 PR PBB SHADOW E&M-EST. PATIENT-LVL I: CPT | Mod: PBBFAC,,,

## 2024-01-09 RX ORDER — CIPROFLOXACIN 500 MG/1
500 TABLET ORAL EVERY 12 HOURS
Qty: 6 TABLET | Refills: 0 | Status: SHIPPED | OUTPATIENT
Start: 2024-01-09 | End: 2024-01-12

## 2024-01-09 RX ORDER — ATOVAQUONE AND PROGUANIL HYDROCHLORIDE 250; 100 MG/1; MG/1
1 TABLET, FILM COATED ORAL DAILY
Qty: 20 TABLET | Refills: 1 | Status: SHIPPED | OUTPATIENT
Start: 2024-01-09

## 2024-01-09 NOTE — PROGRESS NOTES
Subjective:      Patient ID: Ahsan Salmon is a 33 y.o. male.    Chief Complaint:Travel Consult      History of Present Illness    Traveling to [facility] 1/17 thru 1/21.     Had all his childhood vaccinations.  Last dT >10 years ago.           Objective:   Physical Exam  Vitals and nursing note reviewed.   Assessment:     1. Travel advice encounter        Plan:      Ahsan was seen today for travel consult.    Diagnoses and all orders for this visit:    Travel advice encounter    Other orders  -     ciprofloxacin HCl (CIPRO) 500 MG tablet; Take 1 tablet (500 mg total) by mouth every 12 (twelve) hours. Take as needed for traveler's diarrhea. for 3 days  -     atovaquone-proguaniL (MALARONE) 250-100 mg Tab; Take 1 tablet by mouth once daily. Start 1-2 days before entering malaria area, while traveling and 7 days after leaving malaria area.  -     Hepatitis A Vaccine (Adult) (IM); Standing  -     Yellow Fever Vaccine (SQ)  -     Typhoid Vaccine (ViCPs) (IM)  -     Tdap Vaccine        ASSESSMENT: Travel     PLAN:     The Patient was provided with an extensive travel guidance packet which provides travel information specific to the patients itinerary.     The patient's medical history was reviewed and the patient was counseled on:  -Dietary precautions.  -Personal protective measures to prevent insect-borne diseases (e.g., malaria, dengue).  -Precautions to prevent exposure to rabies and seek treatment for possible exposures.  -Precautions against sun exposure.  -Precautions against development of DVT during flight.  -Personal and travel safety.    The patient's immunization history was reviewed and, based on the patient's itinerary, immunizations were ordered.    The patient was encouraged to contact us about any problems that may develop after immunization and possible side effects were reviewed.    The patient was instructed to purchase Imodium over the counter to take in case diarrhea (without blood or fever)  develops. An antibiotic was ordered for treatment if severe or bloody diarrhea develops and the patient was instructed on use and possible side effects.     The patient was also instructed to purchase insect repellent containing DEET or [place] and apply according to repellent label instructions.  If indicated by the patients itinerary an anti-malarial agent was prescribed for malaria prophylaxis and possible side effects were reviewed.    The patient was instructed to contact us if problems develop after travel.    I have spent 30 minutes with the patient and more than 50% of the visit was spent counseling regarding travel.

## 2024-01-09 NOTE — PROGRESS NOTES
Patient received the Typhoid in the left deltoid and the yellow fever vaccine sq in the left arm. Pt also received the Tdap and the Hep A #1 in the right deltoid. Pt tolerated well. Pt asked to wait in the clinic 15 minutes after injection in the event of an allergic reaction. Pt verbalized understanding. Pt left in NAD.

## 2024-01-11 ENCOUNTER — OFFICE VISIT (OUTPATIENT)
Dept: OPHTHALMOLOGY | Facility: CLINIC | Age: 34
End: 2024-01-11
Payer: COMMERCIAL

## 2024-01-11 DIAGNOSIS — H00.11 CHALAZION RIGHT UPPER EYELID: Primary | ICD-10-CM

## 2024-01-11 PROBLEM — H00.15 CHALAZION LEFT LOWER EYELID: Status: RESOLVED | Noted: 2022-03-15 | Resolved: 2024-01-11

## 2024-01-11 PROCEDURE — 67800 REMOVE EYELID LESION: CPT | Mod: E3,S$GLB,, | Performed by: STUDENT IN AN ORGANIZED HEALTH CARE EDUCATION/TRAINING PROGRAM

## 2024-01-11 PROCEDURE — 1160F RVW MEDS BY RX/DR IN RCRD: CPT | Mod: CPTII,S$GLB,, | Performed by: STUDENT IN AN ORGANIZED HEALTH CARE EDUCATION/TRAINING PROGRAM

## 2024-01-11 PROCEDURE — 99999 PR PBB SHADOW E&M-EST. PATIENT-LVL III: CPT | Mod: PBBFAC,,, | Performed by: STUDENT IN AN ORGANIZED HEALTH CARE EDUCATION/TRAINING PROGRAM

## 2024-01-11 PROCEDURE — 99213 OFFICE O/P EST LOW 20 MIN: CPT | Mod: 25,S$GLB,, | Performed by: STUDENT IN AN ORGANIZED HEALTH CARE EDUCATION/TRAINING PROGRAM

## 2024-01-11 PROCEDURE — 1159F MED LIST DOCD IN RCRD: CPT | Mod: CPTII,S$GLB,, | Performed by: STUDENT IN AN ORGANIZED HEALTH CARE EDUCATION/TRAINING PROGRAM

## 2024-01-12 NOTE — ASSESSMENT & PLAN NOTE
Patient with one week of chalazion on RUL  Very motivated for surgical removal today    Plan:  Performed excision of chalazion in minor procedure room today (see note)  Take tobradex BID right eye for one week then stop  Recommedn warm compresses 2x daily for prophylaxis  RTC PRN

## 2024-01-12 NOTE — PROGRESS NOTES
HPI    33 year old male   Pro RUL x 1 week   Warm compresses 4-5 days a week   Pt states that it feels full   He has had chalazia in the past, but they all resolved after a few weeks.   Patient very eager to remove lesion for upcoming trip.   Last edited by Angel Palumbo MD on 2024  9:17 PM.        ROS    Negative for: Constitutional, Gastrointestinal, Neurological, Skin,   Genitourinary, Musculoskeletal, HENT, Endocrine, Cardiovascular, Eyes,   Respiratory, Psychiatric, Allergic/Imm, Heme/Lymph  Last edited by Angel Palumbo MD on 2024  9:17 PM.        Assessment /Plan     For exam results, see Encounter Report.    Chalazion right upper eyelid        Problem List Items Addressed This Visit          Ophtho    Chalazion right upper eyelid - Primary    Current Assessment & Plan     Patient with one week of chalazion on RUL  Very motivated for surgical removal today    Plan:  Performed excision of chalazion in minor procedure room today (see note)  Take tobradex BID right eye for one week then stop  Recommedn warm compresses 2x daily for prophylaxis  RTC PRN             Angel Palumbo MD  Pediatric Ophthalmology and Adult Strabismus  [facility]           Chalazion Excision, right upper eyelid - Procedure Note    Patient Name: Ahsan Salmon  Date of Surgery: 23  Medical Record Number: [id number]  : 1990    Surgeon: Angel Palumbo MD  PREOPERATIVE DIAGNOSIS: Chalazion right upper lid  POSTOPERATIVE DIAGNOSIS: Chalazion right upper lid  PROCEDURE: Chalazion excision right upper lid    FINDINGS:  1. Chalazion right upper lid    ESTIMATED BLOOD LOSS: Minimal.  SPECIMENS REMOVED: None.  COMPLICATIONS: None.    INDICATION FOR PROCEDURE: This patient has a history of chalazion with ocular irritation who presents today for excision.    TECHNIQUE: After informed consent was obtained, the patient was taken to the minor procedure room. The patient was prepped and draped in the  sterile ophthalmic fashion. After a careful time-out was accomplished, local anesthetic (1:1 lidocine 1% with epi : 0.5% bupivicaine with 1cc hylenex) was infused into the right upper eyelid. A chalazion clamp was placed and the lid everted. A #11 blade was used to make a cruciate incision in the tarsal plate underlying the lesion. The chalazion contents were evacuated with a curette. Because of the anterior eruption of the lesion, a small incision was made on the anterior surface of the lid and contents were manually drained. Hemostasis was obtained with palpation. Tobradex ointment was applied.  The patient tolerated the procedure well.     Dr. Palumbo was present for the entire procedure

## 2024-02-02 ENCOUNTER — TELEPHONE (OUTPATIENT)
Dept: SPORTS MEDICINE | Facility: CLINIC | Age: 34
End: 2024-02-02
Payer: COMMERCIAL

## 2024-02-02 DIAGNOSIS — M25.572 ACUTE LEFT ANKLE PAIN: ICD-10-CM

## 2024-02-02 DIAGNOSIS — M54.2 CERVICALGIA: Primary | ICD-10-CM

## 2024-02-05 ENCOUNTER — HOSPITAL ENCOUNTER (OUTPATIENT)
Dept: RADIOLOGY | Facility: HOSPITAL | Age: 34
Discharge: HOME | End: 2024-02-05
Attending: ORTHOPAEDIC SURGERY
Payer: COMMERCIAL

## 2024-02-05 DIAGNOSIS — M54.2 CERVICALGIA: ICD-10-CM

## 2024-02-05 DIAGNOSIS — M25.572 ACUTE LEFT ANKLE PAIN: ICD-10-CM

## 2024-02-05 PROCEDURE — 73610 X-RAY EXAM OF ANKLE: CPT | Mod: 26,LT,, | Performed by: RADIOLOGY

## 2024-02-05 PROCEDURE — 73721 MRI JNT OF LWR EXTRE W/O DYE: CPT | Mod: 26,LT,, | Performed by: RADIOLOGY

## 2024-02-05 PROCEDURE — 72141 MRI NECK SPINE W/O DYE: CPT | Mod: 26,,, | Performed by: RADIOLOGY

## 2024-02-05 PROCEDURE — 73721 MRI JNT OF LWR EXTRE W/O DYE: CPT | Mod: TC,LT

## 2024-02-05 PROCEDURE — 72141 MRI NECK SPINE W/O DYE: CPT | Mod: TC

## 2024-02-05 PROCEDURE — 73610 X-RAY EXAM OF ANKLE: CPT | Mod: TC,LT

## 2024-02-07 ENCOUNTER — TELEPHONE (OUTPATIENT)
Dept: SPORTS MEDICINE | Facility: CLINIC | Age: 34
End: 2024-02-07
Payer: COMMERCIAL

## 2024-02-07 DIAGNOSIS — M54.2 CERVICALGIA: Primary | ICD-10-CM

## 2024-02-12 ENCOUNTER — PROCEDURE VISIT (OUTPATIENT)
Dept: SPORTS MEDICINE | Facility: CLINIC | Age: 34
End: 2024-02-12
Payer: COMMERCIAL

## 2024-02-12 ENCOUNTER — HOSPITAL ENCOUNTER (OUTPATIENT)
Dept: RADIOLOGY | Facility: HOSPITAL | Age: 34
Discharge: HOME | End: 2024-02-12
Attending: ORTHOPAEDIC SURGERY
Payer: COMMERCIAL

## 2024-02-12 DIAGNOSIS — M25.572 ACUTE LEFT ANKLE PAIN: Primary | ICD-10-CM

## 2024-02-12 DIAGNOSIS — M54.2 CERVICALGIA: ICD-10-CM

## 2024-02-12 PROCEDURE — 72125 CT NECK SPINE W/O DYE: CPT | Mod: TC

## 2024-02-12 PROCEDURE — 0232T NJX PLATELET PLASMA: CPT | Mod: S$GLB,,, | Performed by: ORTHOPAEDIC SURGERY

## 2024-02-12 PROCEDURE — 72125 CT NECK SPINE W/O DYE: CPT | Mod: 26,,, | Performed by: RADIOLOGY

## 2024-02-12 PROCEDURE — 99499 UNLISTED E&M SERVICE: CPT | Mod: S$GLB,,, | Performed by: ORTHOPAEDIC SURGERY

## 2024-02-12 NOTE — PROCEDURES
PRE-PROCEDURE DIAGNOSIS:   1. Left ankle pain  Sports Medicine US - Guidance for Needle Placement             PROCEDURE TYPE: ACP under ultrasound guidance     RISKS, BENEFITS, ADVERSE EFFECTS: ACP : We reviewed that this is a medical procedure involving the harvesting of some of patients own blood. This tissue will be minimally manipulated and prepared for injection and after the tissue will be injected into damaged ligaments, tendons, muscle and/or joints for the purpose of strengthening and healing and/or lessening pain at the damaged area(s). The diagnosis, the nature of the treatment, and the theoretical basis of regenerative medicine has been explained. The patient was given the opportunity to ask any questions concerning the specific procedure. We further reviewed that in many medical circles this is considered experimental and/or investigational. It is not standard of care in many medical communities and it is also not covered under insurance. We further reviewed this treatment is part of a treatment program and all parts of the program are essential to a successful outcome, including but not limited to post-injection physical therapy at home and/or in office. As part of a treatment program, this procedure treatment may involve several injections given at each treatment session AND may require multiple treatment sessions. We reviewed that, while this is inherently a safe procedure, there are risks involved. The specific risks depend on the tissue being aspirated and the reinjection site of treatment.      RISKS INCLUDE, but not be limited to:   COMMON: Soreness and/or moderately increased pain with bruising for several days after each treatment and decreased range of motion from what may be normal for the patient on most days. Improvement in pain and function may take 4-12 weeks, occasionally longer in some cases   RARE: I understand these to include, but not be limited to infection, partial or complete  "tendon rupture, worsening pain, headache, bleeding (bruising or hematoma) allergic reaction (itching, rash, shortness of breath, seizures, and even death).     POTENTIAL BENEFITS include: reduction or elimination of pain and greater function. We also reviewed alternative or conventional methods of treatment and the probability that the proposed treatment will or will not be successful. No guarantee or assurance has been made relative to results that may be expected from these techniques. Furthermore, the patient understands that they may withdraw from the treatment or treatment plan at any time.      PROCEDURE DETAILS:         Timeout: Prior to procedure the correct patient, procedure, and site was verified.     Pure ACP  Arthrex [place]  Preparation: Phlebotomy, platelet harvest, and ACP  preparation  Sterile technique was used to phlebotomize 15 cc of the patients blood from a peripheral vein. This blood was  into density-divided layers using an [employer]  centrifuge. The layer of leukocyte poor ACP , a volume of 3 cc, was placed into a sterile syringe in preparation for injection.     Joint Injection: Using ultrasound guidance, the tip of the 22 G x 1.5" needle was seen to directly enter the joint space. Upon confirming proper placement into the joint, the 3cc ACP  mixture was injected. Care was taken to stay within the involved joint for all ACP  injected.       The procedure was tolerated well. Immobilization of the tendon was obtained and was instructed to be maintained for 24-48 hours.     DISCHARGE CONDITION: The patient tolerated the procedure well and there were no immediate complications. The patient was instructed to call the clinic immediately for any mild to moderate adverse side effects, or to call 911 in the event of an emergency.     Complications: none     Estimated blood loss from injection procedure: none     Joint aspirate volume: 0 mL     POST PROCEDURE INSTRUCTIONS: " Post-procedure care handout was provided and explained to the patient before leaving clinic.  Following the procedure, a sterile bandage was placed over the injection site.     Description of ultrasound utilization for needle guidance:    Ultrasound guidance was used for needle localization with SonoSite Edge 2, 9-L MHz linear probe(s). Images were saved and stored for documentation. The ankle joint was well visualized. Dynamic visualization of the needle was continuous throughout the procedure and maintained good position and correct needle placement.

## 2024-02-12 NOTE — PROGRESS NOTES
PRE-PROCEDURE DIAGNOSIS:   1. Left ankle pain  Sports Medicine US - Guidance for Needle Placement          PROCEDURE TYPE: ACP under ultrasound guidance     RISKS, BENEFITS, ADVERSE EFFECTS: ACP : We reviewed that this is a medical procedure involving the harvesting of some of patients own blood. This tissue will be minimally manipulated and prepared for injection and after the tissue will be injected into damaged ligaments, tendons, muscle and/or joints for the purpose of strengthening and healing and/or lessening pain at the damaged area(s). The diagnosis, the nature of the treatment, and the theoretical basis of regenerative medicine has been explained. The patient was given the opportunity to ask any questions concerning the specific procedure. We further reviewed that in many medical circles this is considered experimental and/or investigational. It is not standard of care in many medical communities and it is also not covered under insurance. We further reviewed this treatment is part of a treatment program and all parts of the program are essential to a successful outcome, including but not limited to post-injection physical therapy at home and/or in office. As part of a treatment program, this procedure treatment may involve several injections given at each treatment session AND may require multiple treatment sessions. We reviewed that, while this is inherently a safe procedure, there are risks involved. The specific risks depend on the tissue being aspirated and the reinjection site of treatment.      RISKS INCLUDE, but not be limited to:   COMMON: Soreness and/or moderately increased pain with bruising for several days after each treatment and decreased range of motion from what may be normal for the patient on most days. Improvement in pain and function may take 4-12 weeks, occasionally longer in some cases   RARE: I understand these to include, but not be limited to infection, partial or complete tendon  "rupture, worsening pain, headache, bleeding (bruising or hematoma) allergic reaction (itching, rash, shortness of breath, seizures, and even death).     POTENTIAL BENEFITS include: reduction or elimination of pain and greater function. We also reviewed alternative or conventional methods of treatment and the probability that the proposed treatment will or will not be successful. No guarantee or assurance has been made relative to results that may be expected from these techniques. Furthermore, the patient understands that they may withdraw from the treatment or treatment plan at any time.      PROCEDURE DETAILS:       Timeout: Prior to procedure the correct patient, procedure, and site was verified.     Pure ACP  Arthrex 12 [employer]  Preparation: Phlebotomy, platelet harvest, and ACP  preparation  Sterile technique was used to phlebotomize 15 cc of the patients blood from a peripheral vein. This blood was  into density-divided layers using an [employer]  centrifuge. The layer of leukocyte poor ACP , a volume of 3 cc, was placed into a sterile syringe in preparation for injection.    Joint Injection: Using ultrasound guidance, the tip of the 22 G x 1.5" needle was seen to directly enter the joint space. Upon confirming proper placement into the joint, the 3cc ACP  mixture was injected. Care was taken to stay within the involved joint for all ACP  injected.       The procedure was tolerated well. Immobilization of the tendon was obtained and was instructed to be maintained for 24-48 hours.     DISCHARGE CONDITION: The patient tolerated the procedure well and there were no immediate complications. The patient was instructed to call the clinic immediately for any mild to moderate adverse side effects, or to call 911 in the event of an emergency.     Complications: none     Estimated blood loss from injection procedure: none     Joint aspirate volume: 0 mL     POST PROCEDURE INSTRUCTIONS: Post-procedure care " handout was provided and explained to the patient before leaving clinic.  Following the procedure, a sterile bandage was placed over the injection site.    Description of ultrasound utilization for needle guidance:    Ultrasound guidance was used for needle localization with SonoSite Edge 2, 9-L MHz linear probe(s). Images were saved and stored for documentation. The ankle joint was well visualized. Dynamic visualization of the needle was continuous throughout the procedure and maintained good position and correct needle placement.

## 2024-03-12 ENCOUNTER — HOSPITAL ENCOUNTER (OUTPATIENT)
Dept: RADIOLOGY | Facility: HOSPITAL | Age: 34
Discharge: HOME | End: 2024-03-12
Attending: ORTHOPAEDIC SURGERY
Payer: COMMERCIAL

## 2024-03-12 ENCOUNTER — TELEPHONE (OUTPATIENT)
Dept: SPORTS MEDICINE | Facility: CLINIC | Age: 34
End: 2024-03-12
Payer: COMMERCIAL

## 2024-03-12 DIAGNOSIS — M25.572 ACUTE LEFT ANKLE PAIN: Primary | ICD-10-CM

## 2024-03-12 DIAGNOSIS — M25.572 ACUTE LEFT ANKLE PAIN: ICD-10-CM

## 2024-03-12 PROCEDURE — 73610 X-RAY EXAM OF ANKLE: CPT | Mod: TC,LT

## 2024-03-12 PROCEDURE — 73721 MRI JNT OF LWR EXTRE W/O DYE: CPT | Mod: 26,LT,, | Performed by: RADIOLOGY

## 2024-03-12 PROCEDURE — 73610 X-RAY EXAM OF ANKLE: CPT | Mod: 26,LT,, | Performed by: RADIOLOGY

## 2024-03-12 PROCEDURE — 73721 MRI JNT OF LWR EXTRE W/O DYE: CPT | Mod: TC,LT

## 2024-03-19 ENCOUNTER — TELEPHONE (OUTPATIENT)
Dept: SPORTS MEDICINE | Facility: CLINIC | Age: 34
End: 2024-03-19
Payer: COMMERCIAL

## 2024-03-19 DIAGNOSIS — M25.572 LEFT ANKLE PAIN, UNSPECIFIED CHRONICITY: Primary | ICD-10-CM

## 2024-05-28 DIAGNOSIS — Z00.00 ROUTINE GENERAL MEDICAL EXAMINATION AT A HEALTH CARE FACILITY: Primary | ICD-10-CM

## 2024-05-31 ENCOUNTER — CLINICAL SUPPORT (OUTPATIENT)
Dept: INTERNAL MEDICINE | Facility: CLINIC | Age: 34
End: 2024-05-31

## 2024-05-31 DIAGNOSIS — Z00.00 ROUTINE GENERAL MEDICAL EXAMINATION AT A HEALTH CARE FACILITY: Primary | ICD-10-CM

## 2024-05-31 LAB
25(OH)D3+25(OH)D2 SERPL-MCNC: 46 NG/ML (ref 30–96)
ALBUMIN SERPL BCP-MCNC: 4.4 G/DL (ref 3.5–5.2)
ALP SERPL-CCNC: 66 U/L (ref 55–135)
ALT SERPL W/O P-5'-P-CCNC: 33 U/L (ref 10–44)
ANION GAP SERPL CALC-SCNC: 8 MMOL/L (ref 8–16)
AST SERPL-CCNC: 29 U/L (ref 10–40)
BASOPHILS # BLD AUTO: 0.03 K/UL (ref 0–0.2)
BASOPHILS NFR BLD: 0.5 % (ref 0–1.9)
BILIRUB SERPL-MCNC: 0.8 MG/DL (ref 0.1–1)
BILIRUB UR QL STRIP: NEGATIVE
BUN SERPL-MCNC: 23 MG/DL (ref 6–20)
CALCIUM SERPL-MCNC: 9.5 MG/DL (ref 8.7–10.5)
CHLORIDE SERPL-SCNC: 103 MMOL/L (ref 95–110)
CHOLEST SERPL-MCNC: 148 MG/DL (ref 120–199)
CHOLEST/HDLC SERPL: 3.7 {RATIO} (ref 2–5)
CLARITY UR REFRACT.AUTO: CLEAR
CO2 SERPL-SCNC: 25 MMOL/L (ref 23–29)
COLOR UR AUTO: YELLOW
CREAT SERPL-MCNC: 1.4 MG/DL (ref 0.5–1.4)
DIFFERENTIAL METHOD BLD: ABNORMAL
EOSINOPHIL # BLD AUTO: 0.2 K/UL (ref 0–0.5)
EOSINOPHIL NFR BLD: 3.7 % (ref 0–8)
ERYTHROCYTE [DISTWIDTH] IN BLOOD BY AUTOMATED COUNT: 12.5 % (ref 11.5–14.5)
EST. GFR  (NO RACE VARIABLE): >60 ML/MIN/1.73 M^2
ESTIMATED AVG GLUCOSE: 80 MG/DL (ref 68–131)
GLUCOSE SERPL-MCNC: 89 MG/DL (ref 70–110)
GLUCOSE UR QL STRIP: NEGATIVE
HBA1C MFR BLD: 4.4 % (ref 4–5.6)
HCT VFR BLD AUTO: 43.1 % (ref 40–54)
HDLC SERPL-MCNC: 40 MG/DL (ref 40–75)
HDLC SERPL: 27 % (ref 20–50)
HGB BLD-MCNC: 15.2 G/DL (ref 14–18)
HGB S BLD QL SOLY: NEGATIVE
HGB UR QL STRIP: NEGATIVE
IMM GRANULOCYTES # BLD AUTO: 0.01 K/UL (ref 0–0.04)
IMM GRANULOCYTES NFR BLD AUTO: 0.2 % (ref 0–0.5)
KETONES UR QL STRIP: NEGATIVE
LDLC SERPL CALC-MCNC: 95.2 MG/DL (ref 63–159)
LEUKOCYTE ESTERASE UR QL STRIP: NEGATIVE
LYMPHOCYTES # BLD AUTO: 1.1 K/UL (ref 1–4.8)
LYMPHOCYTES NFR BLD: 18.6 % (ref 18–48)
MCH RBC QN AUTO: 34.5 PG (ref 27–31)
MCHC RBC AUTO-ENTMCNC: 35.3 G/DL (ref 32–36)
MCV RBC AUTO: 98 FL (ref 82–98)
MONOCYTES # BLD AUTO: 0.5 K/UL (ref 0.3–1)
MONOCYTES NFR BLD: 9.4 % (ref 4–15)
NEUTROPHILS # BLD AUTO: 3.9 K/UL (ref 1.8–7.7)
NEUTROPHILS NFR BLD: 67.6 % (ref 38–73)
NITRITE UR QL STRIP: NEGATIVE
NONHDLC SERPL-MCNC: 108 MG/DL
NRBC BLD-RTO: 0 /100 WBC
PH UR STRIP: 6 [PH] (ref 5–8)
PLATELET # BLD AUTO: 218 K/UL (ref 150–450)
PMV BLD AUTO: 11.4 FL (ref 9.2–12.9)
POTASSIUM SERPL-SCNC: 4.6 MMOL/L (ref 3.5–5.1)
PROT SERPL-MCNC: 7.6 G/DL (ref 6–8.4)
PROT UR QL STRIP: ABNORMAL
RBC # BLD AUTO: 4.4 M/UL (ref 4.6–6.2)
SODIUM SERPL-SCNC: 136 MMOL/L (ref 136–145)
SP GR UR STRIP: >=1.03 (ref 1–1.03)
TRIGL SERPL-MCNC: 64 MG/DL (ref 30–150)
TSH SERPL DL<=0.005 MIU/L-ACNC: 1.66 UIU/ML (ref 0.4–4)
URN SPEC COLLECT METH UR: ABNORMAL
WBC # BLD AUTO: 5.74 K/UL (ref 3.9–12.7)

## 2024-05-31 PROCEDURE — 80053 COMPREHEN METABOLIC PANEL: CPT | Performed by: INTERNAL MEDICINE

## 2024-05-31 PROCEDURE — 85025 COMPLETE CBC W/AUTO DIFF WBC: CPT | Performed by: INTERNAL MEDICINE

## 2024-05-31 PROCEDURE — 81003 URINALYSIS AUTO W/O SCOPE: CPT | Performed by: INTERNAL MEDICINE

## 2024-05-31 PROCEDURE — 99211 OFF/OP EST MAY X REQ PHY/QHP: CPT

## 2024-05-31 PROCEDURE — 82955 ASSAY OF G6PD ENZYME: CPT | Performed by: INTERNAL MEDICINE

## 2024-05-31 PROCEDURE — 83036 HEMOGLOBIN GLYCOSYLATED A1C: CPT | Performed by: INTERNAL MEDICINE

## 2024-05-31 PROCEDURE — 84443 ASSAY THYROID STIM HORMONE: CPT | Performed by: INTERNAL MEDICINE

## 2024-05-31 PROCEDURE — 82306 VITAMIN D 25 HYDROXY: CPT | Performed by: INTERNAL MEDICINE

## 2024-05-31 PROCEDURE — 80061 LIPID PANEL: CPT | Performed by: INTERNAL MEDICINE

## 2024-05-31 PROCEDURE — 85660 RBC SICKLE CELL TEST: CPT | Performed by: INTERNAL MEDICINE

## 2024-06-03 LAB — G6PD RBC-CCNT: 0.8 U/G HB (ref 8–11.9)

## 2024-06-05 DIAGNOSIS — Z00.00 HEALTHCARE MAINTENANCE: ICD-10-CM

## 2024-08-15 RX ORDER — CELECOXIB 200 MG/1
200 CAPSULE ORAL 2 TIMES DAILY
Qty: 30 CAPSULE | Refills: 0 | Status: SHIPPED | OUTPATIENT
Start: 2024-08-15

## 2024-09-25 RX ORDER — CELECOXIB 200 MG/1
200 CAPSULE ORAL 2 TIMES DAILY
Qty: 30 CAPSULE | Refills: 1 | Status: SHIPPED | OUTPATIENT
Start: 2024-09-25

## 2024-12-30 ENCOUNTER — TELEPHONE (OUTPATIENT)
Dept: SPORTS MEDICINE | Facility: CLINIC | Age: 34
End: 2024-12-30
Payer: COMMERCIAL

## 2024-12-30 DIAGNOSIS — G89.29 CHRONIC PAIN OF RIGHT ANKLE: Primary | ICD-10-CM

## 2024-12-30 DIAGNOSIS — M25.571 CHRONIC PAIN OF RIGHT ANKLE: Primary | ICD-10-CM

## 2024-12-31 ENCOUNTER — HOSPITAL ENCOUNTER (OUTPATIENT)
Dept: RADIOLOGY | Facility: HOSPITAL | Age: 34
Discharge: HOME | End: 2024-12-31
Attending: ORTHOPAEDIC SURGERY
Payer: COMMERCIAL

## 2024-12-31 ENCOUNTER — HOSPITAL ENCOUNTER (OUTPATIENT)
Dept: RADIOLOGY | Facility: HOSPITAL | Age: 34
Discharge: HOME | End: 2024-12-31
Attending: ORTHOPAEDIC SURGERY

## 2024-12-31 DIAGNOSIS — G89.29 CHRONIC PAIN OF RIGHT ANKLE: ICD-10-CM

## 2024-12-31 DIAGNOSIS — M25.571 CHRONIC PAIN OF RIGHT ANKLE: ICD-10-CM

## 2024-12-31 PROCEDURE — 73610 X-RAY EXAM OF ANKLE: CPT | Mod: 26,RT,, | Performed by: RADIOLOGY

## 2024-12-31 PROCEDURE — 73721 MRI JNT OF LWR EXTRE W/O DYE: CPT | Mod: 26,RT,, | Performed by: RADIOLOGY

## 2024-12-31 PROCEDURE — 73610 X-RAY EXAM OF ANKLE: CPT | Mod: TC,RT

## 2024-12-31 PROCEDURE — 73721 MRI JNT OF LWR EXTRE W/O DYE: CPT | Mod: TC,RT

## 2025-01-06 ENCOUNTER — TELEPHONE (OUTPATIENT)
Dept: SPORTS MEDICINE | Facility: CLINIC | Age: 35
End: 2025-01-06
Payer: COMMERCIAL

## 2025-01-06 DIAGNOSIS — M25.532 LEFT WRIST PAIN: ICD-10-CM

## 2025-01-06 DIAGNOSIS — M54.2 CERVICALGIA: Primary | ICD-10-CM

## 2025-01-06 DIAGNOSIS — M54.9 DORSALGIA, UNSPECIFIED: ICD-10-CM

## 2025-01-07 ENCOUNTER — HOSPITAL ENCOUNTER (OUTPATIENT)
Dept: RADIOLOGY | Facility: HOSPITAL | Age: 35
Discharge: HOME | End: 2025-01-07
Attending: ORTHOPAEDIC SURGERY

## 2025-01-07 DIAGNOSIS — M54.9 DORSALGIA, UNSPECIFIED: ICD-10-CM

## 2025-01-07 DIAGNOSIS — M25.532 LEFT WRIST PAIN: ICD-10-CM

## 2025-01-07 DIAGNOSIS — M54.2 CERVICALGIA: ICD-10-CM

## 2025-01-07 PROCEDURE — 72148 MRI LUMBAR SPINE W/O DYE: CPT | Mod: 26,,, | Performed by: RADIOLOGY

## 2025-01-07 PROCEDURE — 73110 X-RAY EXAM OF WRIST: CPT | Mod: TC,LT

## 2025-01-07 PROCEDURE — 72052 X-RAY EXAM NECK SPINE 6/>VWS: CPT | Mod: TC

## 2025-01-07 PROCEDURE — 73221 MRI JOINT UPR EXTREM W/O DYE: CPT | Mod: 26,LT,, | Performed by: RADIOLOGY

## 2025-01-07 PROCEDURE — 73110 X-RAY EXAM OF WRIST: CPT | Mod: 26,LT,, | Performed by: RADIOLOGY

## 2025-01-07 PROCEDURE — 73221 MRI JOINT UPR EXTREM W/O DYE: CPT | Mod: TC,LT

## 2025-01-07 PROCEDURE — 72148 MRI LUMBAR SPINE W/O DYE: CPT | Mod: TC

## 2025-01-07 PROCEDURE — 72141 MRI NECK SPINE W/O DYE: CPT | Mod: 26,,, | Performed by: RADIOLOGY

## 2025-01-07 PROCEDURE — 72114 X-RAY EXAM L-S SPINE BENDING: CPT | Mod: TC

## 2025-01-07 PROCEDURE — 72052 X-RAY EXAM NECK SPINE 6/>VWS: CPT | Mod: 26,,, | Performed by: RADIOLOGY

## 2025-01-07 PROCEDURE — 72141 MRI NECK SPINE W/O DYE: CPT | Mod: TC

## 2025-02-14 ENCOUNTER — TELEPHONE (OUTPATIENT)
Dept: INFECTIOUS DISEASES | Facility: CLINIC | Age: 35
End: 2025-02-14
Payer: COMMERCIAL

## 2025-02-14 ENCOUNTER — CLINICAL SUPPORT (OUTPATIENT)
Dept: INFECTIOUS DISEASES | Facility: CLINIC | Age: 35
End: 2025-02-14
Payer: COMMERCIAL

## 2025-02-14 DIAGNOSIS — Z00.00 HEALTHCARE MAINTENANCE: Primary | ICD-10-CM

## 2025-02-14 DIAGNOSIS — Z78.9 HISTORY OF FOREIGN TRAVEL: Primary | ICD-10-CM

## 2025-02-14 PROCEDURE — 99999 PR PBB SHADOW E&M-EST. PATIENT-LVL I: CPT | Mod: PBBFAC,,,

## 2025-02-14 RX ORDER — AZITHROMYCIN 500 MG/1
500 TABLET, FILM COATED ORAL DAILY
Qty: 3 TABLET | Refills: 0 | Status: SHIPPED | OUTPATIENT
Start: 2025-02-14

## 2025-02-14 RX ORDER — ATOVAQUONE AND PROGUANIL HYDROCHLORIDE 250; 100 MG/1; MG/1
1 TABLET, FILM COATED ORAL DAILY
Qty: 15 TABLET | Refills: 0 | Status: SHIPPED | OUTPATIENT
Start: 2025-02-14 | End: 2025-03-01

## 2025-02-14 NOTE — TELEPHONE ENCOUNTER
Travel [place] with [place].    Has had YF vaccine.  Leaving in 2 days.    Needs second Hep A vaccine

## 2025-02-14 NOTE — PROGRESS NOTES
Pt received Havrix vaccine IM to right deltoid, pt tolerated injection well and departed from clinic in [facility].

## 2025-05-05 DIAGNOSIS — Z00.00 ROUTINE GENERAL MEDICAL EXAMINATION AT A HEALTH CARE FACILITY: Primary | ICD-10-CM

## 2025-06-09 ENCOUNTER — CLINICAL SUPPORT (OUTPATIENT)
Dept: ADMINISTRATIVE | Facility: CLINIC | Age: 35
End: 2025-06-09

## 2025-06-09 DIAGNOSIS — Z00.00 ROUTINE GENERAL MEDICAL EXAMINATION AT A HEALTH CARE FACILITY: ICD-10-CM

## 2025-06-09 LAB
25(OH)D3+25(OH)D2 SERPL-MCNC: 38 NG/ML (ref 30–96)
ABSOLUTE EOSINOPHIL (OHS): 0.23 K/UL
ABSOLUTE MONOCYTE (OHS): 0.52 K/UL (ref 0.3–1)
ABSOLUTE NEUTROPHIL COUNT (OHS): 3.34 K/UL (ref 1.8–7.7)
ALBUMIN SERPL BCP-MCNC: 4.9 G/DL (ref 3.5–5.2)
ALP SERPL-CCNC: 52 UNIT/L (ref 40–150)
ALT SERPL W/O P-5'-P-CCNC: 39 UNIT/L (ref 10–44)
ANION GAP (OHS): 11 MMOL/L (ref 8–16)
AST SERPL-CCNC: 24 UNIT/L (ref 11–45)
BASOPHILS # BLD AUTO: 0.04 K/UL
BASOPHILS NFR BLD AUTO: 0.7 %
BILIRUB SERPL-MCNC: 1 MG/DL (ref 0.1–1)
BILIRUB UR QL STRIP.AUTO: NEGATIVE
BUN SERPL-MCNC: 16 MG/DL (ref 6–20)
CALCIUM SERPL-MCNC: 9.5 MG/DL (ref 8.7–10.5)
CHLORIDE SERPL-SCNC: 102 MMOL/L (ref 95–110)
CHOLEST SERPL-MCNC: 180 MG/DL (ref 120–199)
CHOLEST/HDLC SERPL: 3.4 {RATIO} (ref 2–5)
CLARITY UR: CLEAR
CO2 SERPL-SCNC: 24 MMOL/L (ref 23–29)
COLOR UR AUTO: YELLOW
CREAT SERPL-MCNC: 1.3 MG/DL (ref 0.5–1.4)
EAG (OHS): 77 MG/DL (ref 68–131)
ERYTHROCYTE [DISTWIDTH] IN BLOOD BY AUTOMATED COUNT: 11.6 % (ref 11.5–14.5)
FERRITIN SERPL-MCNC: 170 NG/ML (ref 20–300)
GFR SERPLBLD CREATININE-BSD FMLA CKD-EPI: >60 ML/MIN/1.73/M2
GLUCOSE SERPL-MCNC: 74 MG/DL (ref 70–110)
GLUCOSE UR QL STRIP: NEGATIVE
HBA1C MFR BLD: 4.3 % (ref 4–5.6)
HCT VFR BLD AUTO: 49.2 % (ref 40–54)
HDLC SERPL-MCNC: 53 MG/DL (ref 40–75)
HDLC SERPL: 29.4 % (ref 20–50)
HGB BLD-MCNC: 17.1 GM/DL (ref 14–18)
HGB S BLD QL SOLY: NEGATIVE
HGB UR QL STRIP: NEGATIVE
IMM GRANULOCYTES # BLD AUTO: 0.01 K/UL (ref 0–0.04)
IMM GRANULOCYTES NFR BLD AUTO: 0.2 % (ref 0–0.5)
IRON SATN MFR SERPL: 33 % (ref 20–50)
IRON SERPL-MCNC: 119 UG/DL (ref 45–160)
KETONES UR QL STRIP: NEGATIVE
LDLC SERPL CALC-MCNC: 110.6 MG/DL (ref 63–159)
LEUKOCYTE ESTERASE UR QL STRIP: NEGATIVE
LYMPHOCYTES # BLD AUTO: 1.46 K/UL (ref 1–4.8)
MCH RBC QN AUTO: 33.5 PG (ref 27–31)
MCHC RBC AUTO-ENTMCNC: 34.8 G/DL (ref 32–36)
MCV RBC AUTO: 97 FL (ref 82–98)
NITRITE UR QL STRIP: NEGATIVE
NONHDLC SERPL-MCNC: 127 MG/DL
NUCLEATED RBC (/100WBC) (OHS): 0 /100 WBC
PH UR STRIP: 6 [PH]
PLATELET # BLD AUTO: 204 K/UL (ref 150–450)
PMV BLD AUTO: 11.6 FL (ref 9.2–12.9)
POTASSIUM SERPL-SCNC: 4.1 MMOL/L (ref 3.5–5.1)
PROT SERPL-MCNC: 7.9 GM/DL (ref 6–8.4)
PROT UR QL STRIP: NEGATIVE
RBC # BLD AUTO: 5.1 M/UL (ref 4.6–6.2)
RELATIVE EOSINOPHIL (OHS): 4.1 %
RELATIVE LYMPHOCYTE (OHS): 26.1 % (ref 18–48)
RELATIVE MONOCYTE (OHS): 9.3 % (ref 4–15)
RELATIVE NEUTROPHIL (OHS): 59.6 % (ref 38–73)
SODIUM SERPL-SCNC: 137 MMOL/L (ref 136–145)
SP GR UR STRIP: 1.02
TIBC SERPL-MCNC: 360 UG/DL (ref 250–450)
TRANSFERRIN SERPL-MCNC: 243 MG/DL (ref 200–375)
TRIGL SERPL-MCNC: 82 MG/DL (ref 30–150)
TSH SERPL-ACNC: 1.79 UIU/ML (ref 0.4–4)
UROBILINOGEN UR STRIP-ACNC: NEGATIVE EU/DL
WBC # BLD AUTO: 5.6 K/UL (ref 3.9–12.7)

## 2025-06-09 PROCEDURE — 93010 ELECTROCARDIOGRAM REPORT: CPT | Mod: S$GLB,,, | Performed by: INTERNAL MEDICINE

## 2025-06-09 PROCEDURE — 83036 HEMOGLOBIN GLYCOSYLATED A1C: CPT

## 2025-06-09 PROCEDURE — 85660 RBC SICKLE CELL TEST: CPT

## 2025-06-09 PROCEDURE — 80061 LIPID PANEL: CPT

## 2025-06-09 PROCEDURE — 82955 ASSAY OF G6PD ENZYME: CPT

## 2025-06-09 PROCEDURE — 84443 ASSAY THYROID STIM HORMONE: CPT

## 2025-06-09 PROCEDURE — 81003 URINALYSIS AUTO W/O SCOPE: CPT

## 2025-06-09 PROCEDURE — 82306 VITAMIN D 25 HYDROXY: CPT

## 2025-06-09 PROCEDURE — 84466 ASSAY OF TRANSFERRIN: CPT

## 2025-06-09 PROCEDURE — 82728 ASSAY OF FERRITIN: CPT

## 2025-06-09 PROCEDURE — 80053 COMPREHEN METABOLIC PANEL: CPT

## 2025-06-09 PROCEDURE — 85025 COMPLETE CBC W/AUTO DIFF WBC: CPT

## 2025-06-11 LAB — G6PD RBC-CCNT: 0.7 U/G HB (ref 8–11.9)

## 2025-06-12 LAB
OHS QRS DURATION: 106 MS
OHS QTC CALCULATION: 376 MS